# Patient Record
Sex: FEMALE | Race: WHITE | Employment: OTHER | URBAN - METROPOLITAN AREA
[De-identification: names, ages, dates, MRNs, and addresses within clinical notes are randomized per-mention and may not be internally consistent; named-entity substitution may affect disease eponyms.]

---

## 2017-01-02 PROBLEM — M81.0 OSTEOPOROSIS: Status: ACTIVE | Noted: 2017-01-02

## 2017-01-02 PROBLEM — J44.1 COPD EXACERBATION (HCC): Status: ACTIVE | Noted: 2017-01-02

## 2017-01-03 ENCOUNTER — CARE COORDINATION (OUTPATIENT)
Dept: CARE COORDINATION | Age: 77
End: 2017-01-03

## 2017-01-11 ENCOUNTER — OFFICE VISIT (OUTPATIENT)
Dept: PRIMARY CARE CLINIC | Age: 77
End: 2017-01-11
Payer: MEDICARE

## 2017-01-11 ENCOUNTER — HOSPITAL ENCOUNTER (OUTPATIENT)
Dept: OTHER | Age: 77
Discharge: HOME | End: 2017-01-11
Attending: PEDIATRICS | Admitting: PEDIATRICS

## 2017-01-11 VITALS
RESPIRATION RATE: 20 BRPM | SYSTOLIC BLOOD PRESSURE: 128 MMHG | HEIGHT: 62 IN | BODY MASS INDEX: 23.37 KG/M2 | WEIGHT: 127 LBS | DIASTOLIC BLOOD PRESSURE: 74 MMHG | HEART RATE: 97 BPM | OXYGEN SATURATION: 90 %

## 2017-01-11 DIAGNOSIS — M85.80 OSTEOPENIA: Chronic | ICD-10-CM

## 2017-01-11 DIAGNOSIS — S22.070A CLOSED WEDGE COMPRESSION FRACTURE OF TENTH THORACIC VERTEBRA, INITIAL ENCOUNTER: ICD-10-CM

## 2017-01-11 DIAGNOSIS — S22.070A CLOSED WEDGE COMPRESSION FRACTURE OF NINTH THORACIC VERTEBRA, INITIAL ENCOUNTER: ICD-10-CM

## 2017-01-11 DIAGNOSIS — M54.6 CHRONIC BILATERAL THORACIC BACK PAIN: ICD-10-CM

## 2017-01-11 DIAGNOSIS — E87.6 HYPOKALEMIA: Primary | ICD-10-CM

## 2017-01-11 DIAGNOSIS — G89.29 CHRONIC BILATERAL THORACIC BACK PAIN: ICD-10-CM

## 2017-01-11 DIAGNOSIS — D64.9 ANEMIA, UNSPECIFIED TYPE: ICD-10-CM

## 2017-01-11 PROCEDURE — 99495 TRANSJ CARE MGMT MOD F2F 14D: CPT | Performed by: PEDIATRICS

## 2017-01-11 ASSESSMENT — ENCOUNTER SYMPTOMS
WHEEZING: 0
ABDOMINAL PAIN: 0
COUGH: 0
SORE THROAT: 0
EYE DISCHARGE: 0
BACK PAIN: 1
SHORTNESS OF BREATH: 0
NAUSEA: 0
VOMITING: 0
SINUS PRESSURE: 0

## 2017-01-12 LAB
ANION GAP SERPL CALCULATED.3IONS-SCNC: 13 MMOL/L (ref 3–16)
BUN BLDV-MCNC: 8 MG/DL (ref 6–20)
CALCIUM SERPL-MCNC: 9.4 MG/DL (ref 8.5–10.5)
CHLORIDE BLD-SCNC: 108 MMOL/L (ref 98–107)
CO2: 26 MMOL/L (ref 20–30)
CREAT SERPL-MCNC: 0.7 MG/DL (ref 0.4–1.2)
GFR AFRICAN AMERICAN: >59
GFR NON-AFRICAN AMERICAN: >60
GLUCOSE BLD-MCNC: 107 MG/DL (ref 74–106)
POTASSIUM SERPL-SCNC: 3.7 MMOL/L (ref 3.4–5.1)
SODIUM BLD-SCNC: 147 MMOL/L (ref 136–145)

## 2017-01-18 ENCOUNTER — TELEPHONE (OUTPATIENT)
Dept: PRIMARY CARE CLINIC | Age: 77
End: 2017-01-18

## 2017-01-18 RX ORDER — DOXEPIN HYDROCHLORIDE 50 MG/1
CAPSULE ORAL
Qty: 30 CAPSULE | Refills: 5 | Status: SHIPPED | OUTPATIENT
Start: 2017-01-18 | End: 2017-08-17 | Stop reason: SDUPTHER

## 2017-01-20 ENCOUNTER — HOSPITAL ENCOUNTER (OUTPATIENT)
Dept: OTHER | Age: 77
Discharge: HOME | End: 2017-01-20
Attending: PEDIATRICS | Admitting: PEDIATRICS

## 2017-01-20 LAB
ANION GAP SERPL CALCULATED.3IONS-SCNC: 12 MMOL/L (ref 3–16)
BUN BLDV-MCNC: 16 MG/DL (ref 6–20)
CALCIUM SERPL-MCNC: 9.6 MG/DL (ref 8.5–10.5)
CHLORIDE BLD-SCNC: 107 MMOL/L (ref 98–107)
CO2: 25 MMOL/L (ref 20–30)
CREAT SERPL-MCNC: 0.8 MG/DL (ref 0.4–1.2)
GFR AFRICAN AMERICAN: >59
GFR NON-AFRICAN AMERICAN: >60
GLUCOSE BLD-MCNC: 83 MG/DL (ref 74–106)
HCT VFR BLD CALC: 35.8 % (ref 37–47)
HEMOGLOBIN: 10.7 G/DL (ref 11.5–16.5)
MCH RBC QN AUTO: 29.6 PG (ref 27–32)
MCHC RBC AUTO-ENTMCNC: 29.9 G/DL (ref 31–35)
MCV RBC AUTO: 99.2 FL (ref 80–100)
PDW BLD-RTO: 14.2 % (ref 11–16)
PLATELET # BLD: 294 K/UL (ref 150–400)
PMV BLD AUTO: 10.4 FL (ref 6–10)
POTASSIUM SERPL-SCNC: 4.2 MMOL/L (ref 3.4–5.1)
RBC # BLD: 3.61 M/UL (ref 3.8–5.8)
SODIUM BLD-SCNC: 144 MMOL/L (ref 136–145)
WBC # BLD: 6.5 K/UL (ref 4–11)

## 2017-02-03 ENCOUNTER — OFFICE VISIT (OUTPATIENT)
Dept: PRIMARY CARE CLINIC | Age: 77
End: 2017-02-03
Payer: MEDICARE

## 2017-02-03 VITALS
DIASTOLIC BLOOD PRESSURE: 70 MMHG | SYSTOLIC BLOOD PRESSURE: 120 MMHG | HEART RATE: 89 BPM | WEIGHT: 121 LBS | OXYGEN SATURATION: 96 % | BODY MASS INDEX: 22.13 KG/M2

## 2017-02-03 DIAGNOSIS — M06.9 RHEUMATOID ARTHRITIS, INVOLVING UNSPECIFIED SITE, UNSPECIFIED RHEUMATOID FACTOR PRESENCE: ICD-10-CM

## 2017-02-03 DIAGNOSIS — F09 COGNITIVE DYSFUNCTION: ICD-10-CM

## 2017-02-03 DIAGNOSIS — F41.9 ANXIETY: Primary | ICD-10-CM

## 2017-02-03 DIAGNOSIS — I10 ESSENTIAL HYPERTENSION: ICD-10-CM

## 2017-02-03 DIAGNOSIS — G47.00 INSOMNIA, UNSPECIFIED TYPE: ICD-10-CM

## 2017-02-03 DIAGNOSIS — J44.9 CHRONIC OBSTRUCTIVE PULMONARY DISEASE, UNSPECIFIED COPD TYPE (HCC): ICD-10-CM

## 2017-02-03 PROCEDURE — G8399 PT W/DXA RESULTS DOCUMENT: HCPCS | Performed by: NURSE PRACTITIONER

## 2017-02-03 PROCEDURE — 1123F ACP DISCUSS/DSCN MKR DOCD: CPT | Performed by: NURSE PRACTITIONER

## 2017-02-03 PROCEDURE — 1090F PRES/ABSN URINE INCON ASSESS: CPT | Performed by: NURSE PRACTITIONER

## 2017-02-03 PROCEDURE — G8419 CALC BMI OUT NRM PARAM NOF/U: HCPCS | Performed by: NURSE PRACTITIONER

## 2017-02-03 PROCEDURE — G8427 DOCREV CUR MEDS BY ELIG CLIN: HCPCS | Performed by: NURSE PRACTITIONER

## 2017-02-03 PROCEDURE — G8484 FLU IMMUNIZE NO ADMIN: HCPCS | Performed by: NURSE PRACTITIONER

## 2017-02-03 PROCEDURE — 3017F COLORECTAL CA SCREEN DOC REV: CPT | Performed by: NURSE PRACTITIONER

## 2017-02-03 PROCEDURE — 4040F PNEUMOC VAC/ADMIN/RCVD: CPT | Performed by: NURSE PRACTITIONER

## 2017-02-03 PROCEDURE — 1036F TOBACCO NON-USER: CPT | Performed by: NURSE PRACTITIONER

## 2017-02-03 PROCEDURE — 3023F SPIROM DOC REV: CPT | Performed by: NURSE PRACTITIONER

## 2017-02-03 PROCEDURE — G8926 SPIRO NO PERF OR DOC: HCPCS | Performed by: NURSE PRACTITIONER

## 2017-02-03 PROCEDURE — 99213 OFFICE O/P EST LOW 20 MIN: CPT | Performed by: NURSE PRACTITIONER

## 2017-02-03 ASSESSMENT — ENCOUNTER SYMPTOMS
RESPIRATORY NEGATIVE: 1
GASTROINTESTINAL NEGATIVE: 1

## 2017-02-07 RX ORDER — PANTOPRAZOLE SODIUM 40 MG/1
40 TABLET, DELAYED RELEASE ORAL DAILY
Qty: 30 TABLET | Refills: 1 | OUTPATIENT
Start: 2017-02-07

## 2017-02-07 RX ORDER — PANTOPRAZOLE SODIUM 40 MG/1
TABLET, DELAYED RELEASE ORAL
Qty: 30 TABLET | Refills: 1 | Status: SHIPPED | OUTPATIENT
Start: 2017-02-07 | End: 2017-04-11 | Stop reason: SDUPTHER

## 2017-02-15 ENCOUNTER — TELEPHONE (OUTPATIENT)
Dept: PRIMARY CARE CLINIC | Age: 77
End: 2017-02-15

## 2017-02-26 ASSESSMENT — ENCOUNTER SYMPTOMS
NAUSEA: 0
SORE THROAT: 0
COUGH: 0
ABDOMINAL PAIN: 0
SHORTNESS OF BREATH: 0
EYE PAIN: 0
VOMITING: 0

## 2017-02-27 RX ORDER — TRIAMTERENE AND HYDROCHLOROTHIAZIDE 37.5; 25 MG/1; MG/1
1 TABLET ORAL DAILY
Qty: 30 TABLET | Refills: 5 | Status: SHIPPED | OUTPATIENT
Start: 2017-02-27 | End: 2017-08-03 | Stop reason: ALTCHOICE

## 2017-02-27 RX ORDER — PREDNISONE 1 MG/1
3 TABLET ORAL DAILY
Qty: 90 TABLET | Refills: 5 | Status: SHIPPED | OUTPATIENT
Start: 2017-02-27 | End: 2017-08-03 | Stop reason: SDUPTHER

## 2017-03-30 RX ORDER — RANITIDINE 300 MG/1
TABLET ORAL
Qty: 60 TABLET | Refills: 5 | Status: SHIPPED | OUTPATIENT
Start: 2017-03-30 | End: 2017-11-17 | Stop reason: SDUPTHER

## 2017-04-11 ENCOUNTER — TELEPHONE (OUTPATIENT)
Dept: PRIMARY CARE CLINIC | Age: 77
End: 2017-04-11

## 2017-05-03 ENCOUNTER — OFFICE VISIT (OUTPATIENT)
Dept: PRIMARY CARE CLINIC | Age: 77
End: 2017-05-03
Payer: MEDICARE

## 2017-05-03 VITALS
HEART RATE: 91 BPM | WEIGHT: 121.8 LBS | DIASTOLIC BLOOD PRESSURE: 70 MMHG | SYSTOLIC BLOOD PRESSURE: 120 MMHG | OXYGEN SATURATION: 93 % | BODY MASS INDEX: 22.28 KG/M2

## 2017-05-03 DIAGNOSIS — F09 COGNITIVE DYSFUNCTION: ICD-10-CM

## 2017-05-03 DIAGNOSIS — F41.9 ANXIETY: Primary | ICD-10-CM

## 2017-05-03 DIAGNOSIS — I10 ESSENTIAL HYPERTENSION: ICD-10-CM

## 2017-05-03 DIAGNOSIS — J44.9 CHRONIC OBSTRUCTIVE PULMONARY DISEASE, UNSPECIFIED COPD TYPE (HCC): ICD-10-CM

## 2017-05-03 DIAGNOSIS — M06.9 RHEUMATOID ARTHRITIS, INVOLVING UNSPECIFIED SITE, UNSPECIFIED RHEUMATOID FACTOR PRESENCE: ICD-10-CM

## 2017-05-03 DIAGNOSIS — G47.00 INSOMNIA, UNSPECIFIED TYPE: ICD-10-CM

## 2017-05-03 PROCEDURE — G8399 PT W/DXA RESULTS DOCUMENT: HCPCS | Performed by: NURSE PRACTITIONER

## 2017-05-03 PROCEDURE — 4040F PNEUMOC VAC/ADMIN/RCVD: CPT | Performed by: NURSE PRACTITIONER

## 2017-05-03 PROCEDURE — G8419 CALC BMI OUT NRM PARAM NOF/U: HCPCS | Performed by: NURSE PRACTITIONER

## 2017-05-03 PROCEDURE — 1090F PRES/ABSN URINE INCON ASSESS: CPT | Performed by: NURSE PRACTITIONER

## 2017-05-03 PROCEDURE — G8926 SPIRO NO PERF OR DOC: HCPCS | Performed by: NURSE PRACTITIONER

## 2017-05-03 PROCEDURE — G8427 DOCREV CUR MEDS BY ELIG CLIN: HCPCS | Performed by: NURSE PRACTITIONER

## 2017-05-03 PROCEDURE — 1036F TOBACCO NON-USER: CPT | Performed by: NURSE PRACTITIONER

## 2017-05-03 PROCEDURE — 3023F SPIROM DOC REV: CPT | Performed by: NURSE PRACTITIONER

## 2017-05-03 PROCEDURE — 99213 OFFICE O/P EST LOW 20 MIN: CPT | Performed by: NURSE PRACTITIONER

## 2017-05-03 PROCEDURE — 3017F COLORECTAL CA SCREEN DOC REV: CPT | Performed by: NURSE PRACTITIONER

## 2017-05-03 PROCEDURE — 1123F ACP DISCUSS/DSCN MKR DOCD: CPT | Performed by: NURSE PRACTITIONER

## 2017-05-03 RX ORDER — CLONAZEPAM 0.5 MG/1
0.5 TABLET ORAL NIGHTLY PRN
Qty: 30 TABLET | Refills: 0 | Status: SHIPPED | OUTPATIENT
Start: 2017-05-03 | End: 2017-06-14 | Stop reason: SDUPTHER

## 2017-05-03 ASSESSMENT — PATIENT HEALTH QUESTIONNAIRE - PHQ9
1. LITTLE INTEREST OR PLEASURE IN DOING THINGS: 1
2. FEELING DOWN, DEPRESSED OR HOPELESS: 1
SUM OF ALL RESPONSES TO PHQ QUESTIONS 1-9: 2
SUM OF ALL RESPONSES TO PHQ9 QUESTIONS 1 & 2: 2

## 2017-05-03 ASSESSMENT — ENCOUNTER SYMPTOMS
SHORTNESS OF BREATH: 1
GASTROINTESTINAL NEGATIVE: 1

## 2017-05-08 ASSESSMENT — ENCOUNTER SYMPTOMS
VOMITING: 0
SORE THROAT: 0
NAUSEA: 0
ABDOMINAL PAIN: 0
EYE PAIN: 0
COUGH: 0

## 2017-05-16 RX ORDER — ATORVASTATIN CALCIUM 80 MG/1
TABLET, FILM COATED ORAL
Qty: 30 TABLET | Refills: 5 | Status: SHIPPED | OUTPATIENT
Start: 2017-05-16 | End: 2017-08-03 | Stop reason: SDUPTHER

## 2017-06-14 RX ORDER — ISOSORBIDE MONONITRATE 30 MG/1
TABLET, EXTENDED RELEASE ORAL
Qty: 30 TABLET | Refills: 5 | Status: SHIPPED | OUTPATIENT
Start: 2017-06-14 | End: 2017-12-27 | Stop reason: SDUPTHER

## 2017-06-14 RX ORDER — CLONAZEPAM 0.5 MG/1
TABLET ORAL
Qty: 30 TABLET | Refills: 0 | Status: SHIPPED | OUTPATIENT
Start: 2017-06-14 | End: 2017-07-13 | Stop reason: SDUPTHER

## 2017-06-14 RX ORDER — UMECLIDINIUM BROMIDE AND VILANTEROL TRIFENATATE 62.5; 25 UG/1; UG/1
POWDER RESPIRATORY (INHALATION)
Qty: 1 EACH | Refills: 4 | Status: SHIPPED | OUTPATIENT
Start: 2017-06-14 | End: 2018-01-08 | Stop reason: SDUPTHER

## 2017-06-29 ENCOUNTER — HOSPITAL ENCOUNTER (OUTPATIENT)
Dept: MRI IMAGING | Age: 77
Discharge: HOME | End: 2017-06-29

## 2017-06-29 DIAGNOSIS — M54.6 PAIN IN THORACIC SPINE: ICD-10-CM

## 2017-07-01 ENCOUNTER — TELEPHONE (OUTPATIENT)
Dept: PRIMARY CARE CLINIC | Age: 77
End: 2017-07-01

## 2017-07-13 RX ORDER — CLONAZEPAM 0.5 MG/1
TABLET ORAL
Qty: 30 TABLET | Refills: 0 | Status: SHIPPED | OUTPATIENT
Start: 2017-07-13 | End: 2017-08-03 | Stop reason: SDUPTHER

## 2017-08-03 ENCOUNTER — OFFICE VISIT (OUTPATIENT)
Dept: PRIMARY CARE CLINIC | Age: 77
End: 2017-08-03
Payer: MEDICARE

## 2017-08-03 ENCOUNTER — HOSPITAL ENCOUNTER (OUTPATIENT)
Dept: OTHER | Age: 77
Discharge: HOME | End: 2017-08-03
Attending: NURSE PRACTITIONER | Admitting: NURSE PRACTITIONER

## 2017-08-03 VITALS
DIASTOLIC BLOOD PRESSURE: 68 MMHG | OXYGEN SATURATION: 98 % | RESPIRATION RATE: 20 BRPM | HEART RATE: 84 BPM | SYSTOLIC BLOOD PRESSURE: 136 MMHG | WEIGHT: 124.6 LBS | TEMPERATURE: 98.6 F | BODY MASS INDEX: 22.79 KG/M2

## 2017-08-03 DIAGNOSIS — E78.5 HYPERLIPIDEMIA, UNSPECIFIED HYPERLIPIDEMIA TYPE: Primary | ICD-10-CM

## 2017-08-03 DIAGNOSIS — M06.9 RHEUMATOID ARTHRITIS, INVOLVING UNSPECIFIED SITE, UNSPECIFIED RHEUMATOID FACTOR PRESENCE: ICD-10-CM

## 2017-08-03 DIAGNOSIS — F41.9 ANXIETY: ICD-10-CM

## 2017-08-03 DIAGNOSIS — S22.000A THORACIC COMPRESSION FRACTURE, CLOSED, INITIAL ENCOUNTER (HCC): ICD-10-CM

## 2017-08-03 DIAGNOSIS — J44.9 CHRONIC OBSTRUCTIVE PULMONARY DISEASE, UNSPECIFIED COPD TYPE (HCC): ICD-10-CM

## 2017-08-03 PROCEDURE — G8428 CUR MEDS NOT DOCUMENT: HCPCS | Performed by: NURSE PRACTITIONER

## 2017-08-03 PROCEDURE — 3017F COLORECTAL CA SCREEN DOC REV: CPT | Performed by: NURSE PRACTITIONER

## 2017-08-03 PROCEDURE — 1123F ACP DISCUSS/DSCN MKR DOCD: CPT | Performed by: NURSE PRACTITIONER

## 2017-08-03 PROCEDURE — G8420 CALC BMI NORM PARAMETERS: HCPCS | Performed by: NURSE PRACTITIONER

## 2017-08-03 PROCEDURE — 1036F TOBACCO NON-USER: CPT | Performed by: NURSE PRACTITIONER

## 2017-08-03 PROCEDURE — 1090F PRES/ABSN URINE INCON ASSESS: CPT | Performed by: NURSE PRACTITIONER

## 2017-08-03 PROCEDURE — 4040F PNEUMOC VAC/ADMIN/RCVD: CPT | Performed by: NURSE PRACTITIONER

## 2017-08-03 PROCEDURE — G8399 PT W/DXA RESULTS DOCUMENT: HCPCS | Performed by: NURSE PRACTITIONER

## 2017-08-03 PROCEDURE — 3023F SPIROM DOC REV: CPT | Performed by: NURSE PRACTITIONER

## 2017-08-03 PROCEDURE — 99213 OFFICE O/P EST LOW 20 MIN: CPT | Performed by: NURSE PRACTITIONER

## 2017-08-03 PROCEDURE — G8926 SPIRO NO PERF OR DOC: HCPCS | Performed by: NURSE PRACTITIONER

## 2017-08-03 RX ORDER — TRAMADOL HYDROCHLORIDE 50 MG/1
50 TABLET ORAL EVERY 6 HOURS PRN
Qty: 30 TABLET | Refills: 2 | Status: SHIPPED | OUTPATIENT
Start: 2017-08-03 | End: 2017-11-03 | Stop reason: SDUPTHER

## 2017-08-03 RX ORDER — CLONAZEPAM 0.5 MG/1
TABLET ORAL
Qty: 30 TABLET | Refills: 0 | Status: SHIPPED | OUTPATIENT
Start: 2017-08-03 | End: 2017-09-12 | Stop reason: SDUPTHER

## 2017-08-03 RX ORDER — ATORVASTATIN CALCIUM 80 MG/1
TABLET, FILM COATED ORAL
Qty: 30 TABLET | Refills: 5 | Status: SHIPPED | OUTPATIENT
Start: 2017-08-03 | End: 2017-11-03 | Stop reason: SDUPTHER

## 2017-08-03 RX ORDER — PREDNISONE 1 MG/1
3 TABLET ORAL DAILY
Qty: 90 TABLET | Refills: 5 | Status: SHIPPED | OUTPATIENT
Start: 2017-08-03 | End: 2018-03-13 | Stop reason: SDUPTHER

## 2017-08-03 ASSESSMENT — ENCOUNTER SYMPTOMS
EYES NEGATIVE: 1
ALLERGIC/IMMUNOLOGIC NEGATIVE: 1
GASTROINTESTINAL NEGATIVE: 1
SHORTNESS OF BREATH: 1
BACK PAIN: 1

## 2017-08-04 DIAGNOSIS — E78.5 HYPERLIPIDEMIA, UNSPECIFIED HYPERLIPIDEMIA TYPE: ICD-10-CM

## 2017-08-04 DIAGNOSIS — M06.9 RHEUMATOID ARTHRITIS, INVOLVING UNSPECIFIED SITE, UNSPECIFIED RHEUMATOID FACTOR PRESENCE: ICD-10-CM

## 2017-08-04 LAB
A/G RATIO: 1.3 (ref 0.8–2)
ALBUMIN SERPL-MCNC: 3.8 G/DL (ref 3.4–4.8)
ALP BLD-CCNC: 62 U/L (ref 25–100)
ALT SERPL-CCNC: 12 U/L (ref 4–36)
ANION GAP SERPL CALCULATED.3IONS-SCNC: 14 MMOL/L (ref 3–16)
AST SERPL-CCNC: 15 U/L (ref 8–33)
BASOPHILS ABSOLUTE: 0 K/UL (ref 0–0.1)
BASOPHILS RELATIVE PERCENT: 0.2 %
BILIRUB SERPL-MCNC: <0.2 MG/DL (ref 0.3–1.2)
BUN BLDV-MCNC: 27 MG/DL (ref 6–20)
CALCIUM SERPL-MCNC: 9.5 MG/DL (ref 8.5–10.5)
CHLORIDE BLD-SCNC: 102 MMOL/L (ref 98–107)
CHOLESTEROL, TOTAL: 139 MG/DL (ref 0–200)
CO2: 25 MMOL/L (ref 20–30)
CREAT SERPL-MCNC: 1.1 MG/DL (ref 0.4–1.2)
EOSINOPHILS ABSOLUTE: 0.1 K/UL (ref 0–0.4)
EOSINOPHILS RELATIVE PERCENT: 1.1 %
GFR AFRICAN AMERICAN: 58
GFR NON-AFRICAN AMERICAN: 48
GLOBULIN: 3 G/DL
GLUCOSE BLD-MCNC: 105 MG/DL (ref 74–106)
HCT VFR BLD CALC: 34.7 % (ref 37–47)
HDLC SERPL-MCNC: 61 MG/DL (ref 40–60)
HEMOGLOBIN: 10.6 G/DL (ref 11.5–16.5)
LDL CHOLESTEROL CALCULATED: 54 MG/DL
LYMPHOCYTES ABSOLUTE: 0.8 K/UL (ref 1.5–4)
LYMPHOCYTES RELATIVE PERCENT: 10.3 % (ref 20–40)
MCH RBC QN AUTO: 29.4 PG (ref 27–32)
MCHC RBC AUTO-ENTMCNC: 30.5 G/DL (ref 31–35)
MCV RBC AUTO: 96.4 FL (ref 80–100)
MONOCYTES ABSOLUTE: 0.5 K/UL (ref 0.2–0.8)
MONOCYTES RELATIVE PERCENT: 6.5 % (ref 3–10)
NEUTROPHILS ABSOLUTE: 6.6 K/UL (ref 2–7.5)
NEUTROPHILS RELATIVE PERCENT: 81.9 %
PDW BLD-RTO: 13.8 % (ref 11–16)
PLATELET # BLD: 332 K/UL (ref 150–400)
PMV BLD AUTO: 9.8 FL (ref 6–10)
POTASSIUM SERPL-SCNC: 4.4 MMOL/L (ref 3.4–5.1)
RBC # BLD: 3.6 M/UL (ref 3.8–5.8)
SODIUM BLD-SCNC: 141 MMOL/L (ref 136–145)
TOTAL PROTEIN: 6.8 G/DL (ref 6.4–8.3)
TRIGL SERPL-MCNC: 120 MG/DL (ref 0–249)
VLDLC SERPL CALC-MCNC: 24 MG/DL
WBC # BLD: 8.1 K/UL (ref 4–11)

## 2017-08-07 ENCOUNTER — TELEPHONE (OUTPATIENT)
Dept: PRIMARY CARE CLINIC | Age: 77
End: 2017-08-07

## 2017-08-14 ASSESSMENT — ENCOUNTER SYMPTOMS
COUGH: 0
ABDOMINAL PAIN: 0
NAUSEA: 0
VOMITING: 0
EYE PAIN: 0
SORE THROAT: 0

## 2017-08-17 ENCOUNTER — HOSPITAL ENCOUNTER (OUTPATIENT)
Dept: PHYSICAL THERAPY | Age: 77
Discharge: HOME | End: 2017-08-31
Attending: ORTHOPAEDIC SURGERY | Admitting: ORTHOPAEDIC SURGERY

## 2017-08-17 ASSESSMENT — PAIN DESCRIPTION - DESCRIPTORS: DESCRIPTORS: ACHING;DULL;DISCOMFORT

## 2017-08-17 ASSESSMENT — PAIN DESCRIPTION - PROGRESSION: CLINICAL_PROGRESSION: NOT CHANGED

## 2017-08-17 ASSESSMENT — PAIN DESCRIPTION - LOCATION: LOCATION: BACK

## 2017-08-17 ASSESSMENT — PAIN DESCRIPTION - PAIN TYPE: TYPE: CHRONIC PAIN

## 2017-08-17 ASSESSMENT — PAIN DESCRIPTION - FREQUENCY: FREQUENCY: CONTINUOUS

## 2017-08-17 ASSESSMENT — PAIN DESCRIPTION - ORIENTATION: ORIENTATION: MID

## 2017-08-18 RX ORDER — DOXEPIN HYDROCHLORIDE 50 MG/1
CAPSULE ORAL
Qty: 30 CAPSULE | Refills: 5 | Status: SHIPPED | OUTPATIENT
Start: 2017-08-18 | End: 2018-03-05 | Stop reason: SDUPTHER

## 2017-08-22 ENCOUNTER — HOSPITAL ENCOUNTER (OUTPATIENT)
Dept: PHYSICAL THERAPY | Age: 77
Discharge: HOME | End: 2017-08-22

## 2017-08-24 ENCOUNTER — HOSPITAL ENCOUNTER (OUTPATIENT)
Dept: PHYSICAL THERAPY | Age: 77
Discharge: HOME | End: 2017-08-24

## 2017-08-29 ENCOUNTER — HOSPITAL ENCOUNTER (OUTPATIENT)
Dept: PHYSICAL THERAPY | Age: 77
Discharge: HOME | End: 2017-08-29

## 2017-08-31 ENCOUNTER — HOSPITAL ENCOUNTER (OUTPATIENT)
Dept: PHYSICAL THERAPY | Age: 77
Discharge: HOME | End: 2017-08-31

## 2017-09-07 ENCOUNTER — HOSPITAL ENCOUNTER (OUTPATIENT)
Dept: PHYSICAL THERAPY | Age: 77
Discharge: HOME | End: 2017-09-07

## 2017-09-12 ENCOUNTER — HOSPITAL ENCOUNTER (OUTPATIENT)
Dept: PHYSICAL THERAPY | Age: 77
Discharge: HOME | End: 2017-09-12

## 2017-09-13 RX ORDER — TRIAMTERENE AND HYDROCHLOROTHIAZIDE 37.5; 25 MG/1; MG/1
TABLET ORAL
Qty: 30 TABLET | Refills: 5 | Status: SHIPPED | OUTPATIENT
Start: 2017-09-13 | End: 2018-04-26 | Stop reason: SDUPTHER

## 2017-09-13 RX ORDER — CETIRIZINE HYDROCHLORIDE 10 MG/1
TABLET ORAL
Qty: 30 TABLET | Refills: 5 | Status: SHIPPED | OUTPATIENT
Start: 2017-09-13 | End: 2018-04-26 | Stop reason: SDUPTHER

## 2017-09-13 RX ORDER — CLONAZEPAM 0.5 MG/1
TABLET ORAL
Qty: 30 TABLET | Refills: 0 | Status: SHIPPED | OUTPATIENT
Start: 2017-09-13 | End: 2017-10-13 | Stop reason: SDUPTHER

## 2017-09-14 ENCOUNTER — HOSPITAL ENCOUNTER (OUTPATIENT)
Dept: PHYSICAL THERAPY | Age: 77
Discharge: HOME | End: 2017-09-14

## 2017-09-19 ENCOUNTER — HOSPITAL ENCOUNTER (OUTPATIENT)
Dept: PHYSICAL THERAPY | Age: 77
Discharge: HOME | End: 2017-09-19

## 2017-09-21 ENCOUNTER — HOSPITAL ENCOUNTER (OUTPATIENT)
Dept: PHYSICAL THERAPY | Age: 77
Discharge: HOME | End: 2017-09-21

## 2017-09-28 ENCOUNTER — HOSPITAL ENCOUNTER (OUTPATIENT)
Dept: PHYSICAL THERAPY | Age: 77
Discharge: HOME | End: 2017-09-28

## 2017-09-28 NOTE — PROGRESS NOTES
Physical Therapy Daily Treatment Note   Date:  2017    TIme In:   1530                 Time Out:     1630           Patient Name:  Grey Escamilla    :  1940  MRN: [id number]    Restrictions/Precautions:    Pertinent Medical History:  Medical/Treatment Diagnosis Information:  ·   thoracic back pain; strain/sprain; s/p compression fracture; difficulty walking     Insurance/Certification information:    Medicare  Physician Information:    Karen Ventura PA-C  Plan of care signed (Y/N):    Visit# / total visits:     11    G-Code (if applicable):      Date / Visit # G-Code Applied:         Progress Note: [x]  Yes  []  No  Next due by: Visit #10      Pain level:   0/10 back    Subjective:  Pt reports her back is not hurting today. Objective:  Observation:   Test measurements: Back Index: 62% , TU\". Palpation:    Exercises:  Exercise Resistance/Repetitions Other comments   Nustep L4 - 5' 28   Standing marching w/support 1' x 2 28   Standing heel-toe raises 20 x 2 28        Pulleys 3' 28   scap squeezes 20x 28   Mid rows with T-band Red: 2 x15 28                              Other Therapeutic Activities:     Manual Treatments:      Modalities:  IFC with MH to T-spine x  15'       Timed Code Treatment Minutes:  45      Total Treatment Minutes:  60    Treatment/Activity Tolerance:  [x] Patient tolerated treatment well [] Patient limited by fatigue  [] Patient limited by pain  [] Patient limited by other medical complications  [x] Other:  Pt completed tx without c/o pain. Pain after treatment:     0/10    Prognosis: [x] Good [] Fair  [] Poor    Goals  Short term goals  Time Frame for Short term goals: 2-3 weeks  Short term goal 1: Independent with HEP. - met  Short term goal 2: Report a 25% decrease in back pain. - met  Long term goals  Time Frame for Long term goals : 6 weeks  Long term goal 1: Achieve back pain at or less than 1/10 > 75% of time with ADL's and I-ADL's.   Long term goal 2:

## 2017-10-01 ENCOUNTER — HOSPITAL ENCOUNTER (OUTPATIENT)
Dept: OTHER | Age: 77
Discharge: HOME | End: 2017-10-31
Attending: ORTHOPAEDIC SURGERY | Admitting: ORTHOPAEDIC SURGERY

## 2017-10-05 ENCOUNTER — HOSPITAL ENCOUNTER (OUTPATIENT)
Dept: PHYSICAL THERAPY | Age: 77
Discharge: HOME | End: 2017-10-05

## 2017-10-05 NOTE — PROGRESS NOTES
weeks  Long term goal 1: Achieve back pain at or less than 1/10 > 75% of time with ADL's and I-ADL's. Long term goal 2: Achieve a TUG time of 14 seconds. - met  Long term goal 3: Report a 50% decrease in fear of falling. Long term goal 4: Achieve a Back Index score of 30 or less. Patient Goals   Patient goals : alleviate back pain, reduce fall risk    Showing gradual, steady progress with functional improvement; pain levels are not changing as much as desired.     Patient Requires Follow-up: [x] Yes  [] No    Plan:   [x] Continue per plan of care [] Alter current plan (see comments)  [] Plan of care initiated [] Hold pending MD visit [] Discharge    Plan for Next Session:        Electronically signed by:   Electronically signed by Edwardo Rod PTA on 10/5/2017 at 3:29 PM

## 2017-10-09 RX ORDER — IPRATROPIUM BROMIDE AND ALBUTEROL SULFATE 2.5; .5 MG/3ML; MG/3ML
SOLUTION RESPIRATORY (INHALATION)
Qty: 360 ML | Refills: 5 | Status: SHIPPED | OUTPATIENT
Start: 2017-10-09 | End: 2018-04-26 | Stop reason: SDUPTHER

## 2017-10-13 RX ORDER — CLONAZEPAM 0.5 MG/1
TABLET ORAL
Qty: 30 TABLET | Refills: 0 | Status: SHIPPED | OUTPATIENT
Start: 2017-10-13 | End: 2017-11-03 | Stop reason: SDUPTHER

## 2017-11-01 ENCOUNTER — HOSPITAL ENCOUNTER (OUTPATIENT)
Dept: OTHER | Age: 77
Discharge: HOME | End: 2017-11-30
Attending: ORTHOPAEDIC SURGERY | Admitting: ORTHOPAEDIC SURGERY

## 2017-11-03 ENCOUNTER — OFFICE VISIT (OUTPATIENT)
Dept: PRIMARY CARE CLINIC | Age: 77
End: 2017-11-03
Payer: MEDICARE

## 2017-11-03 VITALS
SYSTOLIC BLOOD PRESSURE: 118 MMHG | OXYGEN SATURATION: 93 % | HEART RATE: 89 BPM | BODY MASS INDEX: 22.68 KG/M2 | DIASTOLIC BLOOD PRESSURE: 60 MMHG | WEIGHT: 124 LBS

## 2017-11-03 DIAGNOSIS — F41.9 ANXIETY: ICD-10-CM

## 2017-11-03 DIAGNOSIS — K21.9 GASTROESOPHAGEAL REFLUX DISEASE WITHOUT ESOPHAGITIS: ICD-10-CM

## 2017-11-03 DIAGNOSIS — M06.9 RHEUMATOID ARTHRITIS, INVOLVING UNSPECIFIED SITE, UNSPECIFIED RHEUMATOID FACTOR PRESENCE: Primary | ICD-10-CM

## 2017-11-03 DIAGNOSIS — D64.9 ANEMIA, UNSPECIFIED TYPE: ICD-10-CM

## 2017-11-03 DIAGNOSIS — E78.5 HYPERLIPIDEMIA, UNSPECIFIED HYPERLIPIDEMIA TYPE: ICD-10-CM

## 2017-11-03 PROCEDURE — 1090F PRES/ABSN URINE INCON ASSESS: CPT | Performed by: NURSE PRACTITIONER

## 2017-11-03 PROCEDURE — G8427 DOCREV CUR MEDS BY ELIG CLIN: HCPCS | Performed by: NURSE PRACTITIONER

## 2017-11-03 PROCEDURE — 90688 IIV4 VACCINE SPLT 0.5 ML IM: CPT | Performed by: NURSE PRACTITIONER

## 2017-11-03 PROCEDURE — 4040F PNEUMOC VAC/ADMIN/RCVD: CPT | Performed by: NURSE PRACTITIONER

## 2017-11-03 PROCEDURE — 1036F TOBACCO NON-USER: CPT | Performed by: NURSE PRACTITIONER

## 2017-11-03 PROCEDURE — G8420 CALC BMI NORM PARAMETERS: HCPCS | Performed by: NURSE PRACTITIONER

## 2017-11-03 PROCEDURE — G0008 ADMIN INFLUENZA VIRUS VAC: HCPCS | Performed by: NURSE PRACTITIONER

## 2017-11-03 PROCEDURE — G8399 PT W/DXA RESULTS DOCUMENT: HCPCS | Performed by: NURSE PRACTITIONER

## 2017-11-03 PROCEDURE — 99213 OFFICE O/P EST LOW 20 MIN: CPT | Performed by: NURSE PRACTITIONER

## 2017-11-03 PROCEDURE — G8484 FLU IMMUNIZE NO ADMIN: HCPCS | Performed by: NURSE PRACTITIONER

## 2017-11-03 PROCEDURE — 1123F ACP DISCUSS/DSCN MKR DOCD: CPT | Performed by: NURSE PRACTITIONER

## 2017-11-03 PROCEDURE — 3017F COLORECTAL CA SCREEN DOC REV: CPT | Performed by: NURSE PRACTITIONER

## 2017-11-03 RX ORDER — PREDNISONE 10 MG/1
10 TABLET ORAL DAILY
Qty: 42 TABLET | Refills: 0 | Status: SHIPPED | OUTPATIENT
Start: 2017-11-03 | End: 2018-01-04

## 2017-11-03 RX ORDER — CLONAZEPAM 0.5 MG/1
TABLET ORAL
Qty: 30 TABLET | Refills: 0 | Status: SHIPPED | OUTPATIENT
Start: 2017-11-03 | End: 2017-12-20 | Stop reason: SDUPTHER

## 2017-11-03 RX ORDER — PANTOPRAZOLE SODIUM 40 MG/1
TABLET, DELAYED RELEASE ORAL
Qty: 30 TABLET | Refills: 5 | Status: SHIPPED | OUTPATIENT
Start: 2017-11-03 | End: 2018-01-04

## 2017-11-03 RX ORDER — TRAMADOL HYDROCHLORIDE 50 MG/1
50 TABLET ORAL EVERY 6 HOURS PRN
Qty: 60 TABLET | Refills: 2 | Status: SHIPPED | OUTPATIENT
Start: 2017-11-03 | End: 2017-11-13

## 2017-11-03 RX ORDER — ATORVASTATIN CALCIUM 80 MG/1
TABLET, FILM COATED ORAL
Qty: 30 TABLET | Refills: 5 | Status: SHIPPED | OUTPATIENT
Start: 2017-11-03 | End: 2018-04-26 | Stop reason: SDUPTHER

## 2017-11-03 ASSESSMENT — ENCOUNTER SYMPTOMS
EYE PAIN: 0
COUGH: 0
VOMITING: 0
SORE THROAT: 0
ABDOMINAL PAIN: 0
SHORTNESS OF BREATH: 1

## 2017-11-03 NOTE — PROGRESS NOTES
Have you seen any other physician or provider since your last visit? no    Have you had any other diagnostic tests since your last visit? no    Have you changed or stopped any medications since your last visit including any over-the-counter medicines, vitamins, or herbal medicines? no     Are you taking all your prescribed medications? Yes  If NO, why? -  N/A      REVIEW OF SYSTEMS:  Review of Systems   Constitutional: Negative for chills and fever. HENT: Negative for ear pain and sore throat. Eyes: Negative for pain and visual disturbance. Respiratory: Positive for shortness of breath. Negative for cough. Cardiovascular: Negative for chest pain, palpitations and leg swelling. Gastrointestinal: Positive for nausea. Negative for abdominal pain and vomiting. Genitourinary: Negative for dysuria and hematuria. Musculoskeletal: Positive for arthralgias. Negative for joint swelling. Severe pain in hands   Skin: Negative for rash. Neurological: Negative for dizziness and weakness. Psychiatric/Behavioral: Negative for sleep disturbance.

## 2017-11-03 NOTE — PATIENT INSTRUCTIONS
Prednisone taper for breathing and arthritis. Do not take very much Ibuprofen due to being on Prednisone- could hurt your stomach. I went ahead and sent Klonopin- they can fill it when it is due.

## 2017-11-13 ENCOUNTER — HOSPITAL ENCOUNTER (OUTPATIENT)
Dept: OTHER | Age: 77
Discharge: HOME | End: 2017-11-13
Attending: INTERNAL MEDICINE | Admitting: INTERNAL MEDICINE

## 2017-11-13 LAB
A/G RATIO: 1.2 (ref 0.8–2)
ALBUMIN SERPL-MCNC: 3.7 G/DL (ref 3.4–4.8)
ALP BLD-CCNC: 56 U/L (ref 25–100)
ALT SERPL-CCNC: 7 U/L (ref 4–36)
ANION GAP SERPL CALCULATED.3IONS-SCNC: 11 MMOL/L (ref 3–16)
AST SERPL-CCNC: 13 U/L (ref 8–33)
BILIRUB SERPL-MCNC: 0.4 MG/DL (ref 0.3–1.2)
BUN BLDV-MCNC: 22 MG/DL (ref 6–20)
C-REACTIVE PROTEIN: 13.2 MG/L (ref 0–5.1)
CALCIUM SERPL-MCNC: 9.3 MG/DL (ref 8.5–10.5)
CHLORIDE BLD-SCNC: 101 MMOL/L (ref 98–107)
CO2: 27 MMOL/L (ref 20–30)
CREAT SERPL-MCNC: 1 MG/DL (ref 0.4–1.2)
GFR AFRICAN AMERICAN: >59
GFR NON-AFRICAN AMERICAN: 54
GLOBULIN: 3 G/DL
GLUCOSE BLD-MCNC: 102 MG/DL (ref 74–106)
HCT VFR BLD CALC: 34.9 % (ref 37–47)
HEMOGLOBIN: 10.6 G/DL (ref 11.5–16.5)
MCH RBC QN AUTO: 29.7 PG (ref 27–32)
MCHC RBC AUTO-ENTMCNC: 30.4 G/DL (ref 31–35)
MCV RBC AUTO: 97.8 FL (ref 80–100)
PDW BLD-RTO: 14.9 % (ref 11–16)
PLATELET # BLD: 268 K/UL (ref 150–400)
PMV BLD AUTO: 9 FL (ref 6–10)
POTASSIUM SERPL-SCNC: 4.3 MMOL/L (ref 3.4–5.1)
RBC # BLD: 3.57 M/UL (ref 3.8–5.8)
SODIUM BLD-SCNC: 139 MMOL/L (ref 136–145)
TOTAL PROTEIN: 6.7 G/DL (ref 6.4–8.3)
WBC # BLD: 9.8 K/UL (ref 4–11)

## 2017-11-19 ASSESSMENT — ENCOUNTER SYMPTOMS: NAUSEA: 1

## 2017-11-20 RX ORDER — CLONAZEPAM 0.5 MG/1
TABLET ORAL
Qty: 30 TABLET | Refills: 0 | Status: SHIPPED | OUTPATIENT
Start: 2017-11-20 | End: 2018-01-04

## 2017-11-20 RX ORDER — RANITIDINE 300 MG/1
TABLET ORAL
Qty: 60 TABLET | Refills: 5 | Status: SHIPPED | OUTPATIENT
Start: 2017-11-20 | End: 2018-04-26 | Stop reason: SDUPTHER

## 2017-11-20 NOTE — PROGRESS NOTES
SUBJECTIVE:    Patient ID: Laina Phillips is a 77 y.o. female. Medical history Review  Past Medical, Family, and Social History reviewed and does contribute to the patient presenting condition    Health Maintenance Due   Topic Date Due    Colon cancer screen colonoscopy  11/22/1991    Zostavax vaccine  11/22/2001    Smoker: low dose lung CT screening  05/12/2012        HPI:   Chief Complaint   Patient presents with    Anxiety     Patient here today for a follow up. She is sick to her stomach and her pain medication is not working. She is wanting a flu shot today.  Hyperlipidemia       Patient's medications, allergies, past medical, surgical, social and family histories were reviewed and updated as appropriate. Review of Systems Reviewed and acurate. See MA note. OBJECTIVE:  /60 (Site: Right Arm, Position: Sitting, Cuff Size: Medium Adult)   Pulse 89   Wt 124 lb (56.2 kg)   SpO2 93%   BMI 22.68 kg/m²    Physical Exam   Constitutional: She is oriented to person, place, and time. She appears well-developed and well-nourished. No distress. HENT:   Head: Normocephalic. Right Ear: Tympanic membrane normal.   Left Ear: Tympanic membrane normal.   Mouth/Throat: No oropharyngeal exudate. Eyes: Lids are normal.   Neck: Neck supple. Cardiovascular: Normal rate, regular rhythm and normal heart sounds. Pulmonary/Chest: Effort normal. She has decreased breath sounds. Abdominal: Soft. Bowel sounds are normal. She exhibits no distension. There is no tenderness. Musculoskeletal: She exhibits no edema. Right hand: She exhibits tenderness and swelling. Left hand: She exhibits tenderness and swelling. Lymphadenopathy:     She has no cervical adenopathy. Neurological: She is alert and oriented to person, place, and time. Skin: Skin is warm and dry. Psychiatric: She has a normal mood and affect. Vitals reviewed.       Results in Past 30 Days  Result Component Current Result Ref Range Previous Result Ref Range   Alb 3.7 (11/13/2017) 3.4 - 4.8 g/dL Not in Time Range    Albumin/Globulin Ratio 1.2 (11/13/2017) 0.8 - 2.0 Not in Time Range    Alkaline Phosphatase 56 (11/13/2017) 25 - 100 U/L Not in Time Range    ALT 7 (11/13/2017) 4 - 36 U/L Not in Time Range    AST 13 (11/13/2017) 8 - 33 U/L Not in Time Range    BUN 22 (H) (11/13/2017) 6 - 20 mg/dL Not in Time Range    Calcium 9.3 (11/13/2017) 8.5 - 10.5 mg/dL Not in Time Range    Chloride 101 (11/13/2017) 98 - 107 mmol/L Not in Time Range    CO2 27 (11/13/2017) 20 - 30 mmol/L Not in Time Range    CREATININE 1.0 (11/13/2017) 0.4 - 1.2 mg/dL Not in Time Range    GFR  >59 (11/13/2017) >59 Not in Time Range    GFR Non- 54 (L) (11/13/2017) >59 Not in Time Range    Globulin 3.0 (11/13/2017) g/dL Not in Time Range    Glucose 102 (11/13/2017) 74 - 106 mg/dL Not in Time Range    Potassium 4.3 (11/13/2017) 3.4 - 5.1 mmol/L Not in Time Range    Sodium 139 (11/13/2017) 136 - 145 mmol/L Not in Time Range    Total Bilirubin 0.4 (11/13/2017) 0.3 - 1.2 mg/dL Not in Time Range    Total Protein 6.7 (11/13/2017) 6.4 - 8.3 g/dL Not in Time Range        Hemoglobin A1C (%)   Date Value   09/22/2015 5.1     Microscopic Examination (no units)   Date Value   01/01/2017 Not Indicated     LDL Calculated (mg/dL)   Date Value   08/03/2017 54         Lab Results   Component Value Date    WBC 9.8 11/13/2017    NEUTROABS 6.6 08/03/2017    HGB 10.6 11/13/2017    HCT 34.9 11/13/2017    MCV 97.8 11/13/2017     11/13/2017       Lab Results   Component Value Date    TSH 2.43 01/01/2017       Prior to Visit Medications    Medication Sig Taking?  Authorizing Provider   predniSONE (DELTASONE) 10 MG tablet Take 1 tablet by mouth daily 60mg x2d, 50mg x2d, 40mg x2d, 30mg x2d, 20mg x2d, 10mg x2d, then stop Yes JOYCE Gray   pantoprazole (PROTONIX) 40 MG tablet Take 1 tablet by mouth daily Yes JOYCE Gray

## 2017-12-20 RX ORDER — CLONAZEPAM 0.5 MG/1
TABLET ORAL
Qty: 30 TABLET | Refills: 0 | Status: SHIPPED | OUTPATIENT
Start: 2017-12-20 | End: 2018-01-03 | Stop reason: SDUPTHER

## 2017-12-22 ENCOUNTER — TELEPHONE (OUTPATIENT)
Dept: PRIMARY CARE CLINIC | Age: 77
End: 2017-12-22

## 2017-12-22 RX ORDER — ONDANSETRON 4 MG/1
4 TABLET, ORALLY DISINTEGRATING ORAL EVERY 8 HOURS PRN
Qty: 20 TABLET | Refills: 0 | Status: SHIPPED | OUTPATIENT
Start: 2017-12-22 | End: 2018-01-03 | Stop reason: SDUPTHER

## 2017-12-22 RX ORDER — DOCUSATE SODIUM 100 MG/1
100 CAPSULE, LIQUID FILLED ORAL 2 TIMES DAILY
Qty: 60 CAPSULE | Refills: 3 | Status: SHIPPED | OUTPATIENT
Start: 2017-12-22 | End: 2018-04-26 | Stop reason: SDUPTHER

## 2017-12-28 RX ORDER — ISOSORBIDE MONONITRATE 30 MG/1
TABLET, EXTENDED RELEASE ORAL
Qty: 30 TABLET | Refills: 5 | Status: SHIPPED | OUTPATIENT
Start: 2017-12-28 | End: 2018-04-26 | Stop reason: SDUPTHER

## 2018-01-03 ENCOUNTER — TELEPHONE (OUTPATIENT)
Dept: PRIMARY CARE CLINIC | Age: 78
End: 2018-01-03

## 2018-01-03 RX ORDER — POLYETHYLENE GLYCOL 3350 17 G/17G
17 POWDER, FOR SOLUTION ORAL DAILY
Qty: 510 G | Refills: 5 | COMMUNITY
Start: 2018-01-03 | End: 2018-02-02

## 2018-01-03 RX ORDER — CLONAZEPAM 0.5 MG/1
0.5 TABLET ORAL 2 TIMES DAILY PRN
Qty: 60 TABLET | Refills: 0 | Status: SHIPPED | OUTPATIENT
Start: 2018-01-03 | End: 2018-02-08 | Stop reason: SDUPTHER

## 2018-01-03 RX ORDER — ONDANSETRON 4 MG/1
4 TABLET, ORALLY DISINTEGRATING ORAL EVERY 8 HOURS PRN
Qty: 30 TABLET | Refills: 5 | Status: SHIPPED | OUTPATIENT
Start: 2018-01-03 | End: 2018-04-26 | Stop reason: ALTCHOICE

## 2018-01-05 ENCOUNTER — CARE COORDINATION (OUTPATIENT)
Dept: CARE COORDINATION | Age: 78
End: 2018-01-05

## 2018-01-05 NOTE — CARE COORDINATION
Ambulatory Care Coordination  ED Follow up Call    Reason for ED visit:  Sent by OT for possible pneumonia   Status:     improved    Did you call your PCP prior to going to the ED? No      Did you receive a discharge instructions from the Emergency Room? Yes  Review of Instructions:     Understands what to report/when to return?:  Yes   Understands discharge instructions?:  Yes   Following discharge instructions?:  Yes   If not why? na    Are there any new complaints of pain? No  New Pain Meds? No    Constipation prophylaxis needed? N/A    If you have a wound is the dressing clean, dry, and intact? N/A  Understands wound care regimen? N/A    Are there any other complaints/concerns that you wish to tell your provider? Daughter states she is doing well. She had good breakfast and feeling better. She made appointment today with PCP and has antibiotic RX if needed. FU appts/Provider:    Future Appointments  Date Time Provider Brigette Alexander   1/10/2018 1:30 PM JOYCE Lynn [facility]   2/6/2018 1:15 PM JOYCE Lynn [facility]           New Medications?:   Yes      Medication Reconciliation by phone - Yes  Understands Medications? Yes  Taking Medications? Yes  Can you swallow your pills? Yes    Any further needs in the home i.e. Equipment?   No    Link to services in community?:  No   Which services:  na

## 2018-01-09 RX ORDER — UMECLIDINIUM BROMIDE AND VILANTEROL TRIFENATATE 62.5; 25 UG/1; UG/1
POWDER RESPIRATORY (INHALATION)
Qty: 1 EACH | Refills: 5 | Status: SHIPPED | OUTPATIENT
Start: 2018-01-09 | End: 2018-04-26 | Stop reason: SDUPTHER

## 2018-01-16 ENCOUNTER — TELEPHONE (OUTPATIENT)
Dept: PRIMARY CARE CLINIC | Age: 78
End: 2018-01-16

## 2018-01-17 ENCOUNTER — HOSPITAL ENCOUNTER (OUTPATIENT)
Dept: OTHER | Age: 78
Discharge: HOME | End: 2018-01-17
Attending: INTERNAL MEDICINE | Admitting: INTERNAL MEDICINE

## 2018-01-20 LAB
GRAM STAIN RESULT: ABNORMAL
ORGANISM: ABNORMAL
WOUND/ABSCESS: ABNORMAL
WOUND/ABSCESS: ABNORMAL

## 2018-01-22 ENCOUNTER — TELEPHONE (OUTPATIENT)
Dept: PRIMARY CARE CLINIC | Age: 78
End: 2018-01-22

## 2018-01-22 NOTE — TELEPHONE ENCOUNTER
Alberto reported that patient's wound culture was positive for MRSA. They are requesting an antibiotic for the patient.

## 2018-01-23 ENCOUNTER — TELEPHONE (OUTPATIENT)
Dept: PRIMARY CARE CLINIC | Age: 78
End: 2018-01-23

## 2018-01-23 RX ORDER — SULFAMETHOXAZOLE AND TRIMETHOPRIM 800; 160 MG/1; MG/1
1 TABLET ORAL 2 TIMES DAILY
Qty: 20 TABLET | Refills: 0 | Status: SHIPPED | OUTPATIENT
Start: 2018-01-23 | End: 2018-01-25

## 2018-01-25 ENCOUNTER — HOSPITAL ENCOUNTER (OUTPATIENT)
Dept: OTHER | Age: 78
Discharge: HOME | End: 2018-01-25
Attending: NURSE PRACTITIONER | Admitting: NURSE PRACTITIONER

## 2018-01-25 ENCOUNTER — OFFICE VISIT (OUTPATIENT)
Dept: PRIMARY CARE CLINIC | Age: 78
End: 2018-01-25
Payer: MEDICARE

## 2018-01-25 VITALS
WEIGHT: 115.2 LBS | HEART RATE: 64 BPM | SYSTOLIC BLOOD PRESSURE: 110 MMHG | OXYGEN SATURATION: 97 % | BODY MASS INDEX: 21.07 KG/M2 | DIASTOLIC BLOOD PRESSURE: 60 MMHG

## 2018-01-25 DIAGNOSIS — I10 ESSENTIAL HYPERTENSION: ICD-10-CM

## 2018-01-25 DIAGNOSIS — R73.9 HYPERGLYCEMIA: ICD-10-CM

## 2018-01-25 DIAGNOSIS — K59.00 CONSTIPATION, UNSPECIFIED CONSTIPATION TYPE: ICD-10-CM

## 2018-01-25 DIAGNOSIS — D64.9 ANEMIA, UNSPECIFIED TYPE: Primary | ICD-10-CM

## 2018-01-25 DIAGNOSIS — Z93.3 COLOSTOMY PRESENT (HCC): ICD-10-CM

## 2018-01-25 DIAGNOSIS — M06.9 RHEUMATOID ARTHRITIS, INVOLVING UNSPECIFIED SITE, UNSPECIFIED RHEUMATOID FACTOR PRESENCE: ICD-10-CM

## 2018-01-25 DIAGNOSIS — K63.1 COLON PERFORATION (HCC): ICD-10-CM

## 2018-01-25 DIAGNOSIS — D64.9 ANEMIA, UNSPECIFIED TYPE: ICD-10-CM

## 2018-01-25 LAB
A/G RATIO: 1.1 (ref 0.8–2)
ALBUMIN SERPL-MCNC: 3.6 G/DL (ref 3.4–4.8)
ALP BLD-CCNC: 68 U/L (ref 25–100)
ALT SERPL-CCNC: 18 U/L (ref 4–36)
ANION GAP SERPL CALCULATED.3IONS-SCNC: 15 MMOL/L (ref 3–16)
AST SERPL-CCNC: 25 U/L (ref 8–33)
BASOPHILS ABSOLUTE: 0.1 K/UL (ref 0–0.1)
BASOPHILS RELATIVE PERCENT: 0.5 %
BILIRUB SERPL-MCNC: <0.2 MG/DL (ref 0.3–1.2)
BUN BLDV-MCNC: 23 MG/DL (ref 6–20)
CALCIUM SERPL-MCNC: 9.1 MG/DL (ref 8.5–10.5)
CHLORIDE BLD-SCNC: 98 MMOL/L (ref 98–107)
CO2: 25 MMOL/L (ref 20–30)
CREAT SERPL-MCNC: 0.9 MG/DL (ref 0.4–1.2)
EOSINOPHILS ABSOLUTE: 0.2 K/UL (ref 0–0.4)
EOSINOPHILS RELATIVE PERCENT: 1.8 %
FERRITIN: 20.9 NG/ML (ref 22–322)
FOLATE: >20 NG/ML
GFR AFRICAN AMERICAN: >59
GFR NON-AFRICAN AMERICAN: >60
GLOBULIN: 3.3 G/DL
GLUCOSE BLD-MCNC: 113 MG/DL (ref 74–106)
HBA1C MFR BLD: 4.6 %
HCT VFR BLD CALC: 34 % (ref 37–47)
HEMOGLOBIN: 9.8 G/DL (ref 11.5–16.5)
IMMATURE GRANULOCYTES #: 0.1 K/UL
IMMATURE GRANULOCYTES %: 0.7 % (ref 0–5)
IRON: 30 UG/DL (ref 37–145)
LYMPHOCYTES ABSOLUTE: 1.2 K/UL (ref 1.5–4)
LYMPHOCYTES RELATIVE PERCENT: 11.1 %
MCH RBC QN AUTO: 27.4 PG (ref 27–32)
MCHC RBC AUTO-ENTMCNC: 28.8 G/DL (ref 31–35)
MCV RBC AUTO: 95 FL (ref 80–100)
MONOCYTES ABSOLUTE: 0.8 K/UL (ref 0.2–0.8)
MONOCYTES RELATIVE PERCENT: 7.1 %
NEUTROPHILS ABSOLUTE: 8.4 K/UL (ref 2–7.5)
NEUTROPHILS RELATIVE PERCENT: 78.8 %
PDW BLD-RTO: 14.2 % (ref 11–16)
PLATELET # BLD: 340 K/UL (ref 150–400)
PMV BLD AUTO: 10.3 FL (ref 6–10)
POTASSIUM SERPL-SCNC: 4.2 MMOL/L (ref 3.4–5.1)
RBC # BLD: 3.58 M/UL (ref 3.8–5.8)
SODIUM BLD-SCNC: 138 MMOL/L (ref 136–145)
TOTAL IRON BINDING CAPACITY: 350 UG/DL (ref 250–450)
TOTAL PROTEIN: 6.9 G/DL (ref 6.4–8.3)
VITAMIN B-12: 1718 PG/ML (ref 211–911)
WBC # BLD: 10.7 K/UL (ref 4–11)

## 2018-01-25 PROCEDURE — 99214 OFFICE O/P EST MOD 30 MIN: CPT | Performed by: NURSE PRACTITIONER

## 2018-01-25 PROCEDURE — 1090F PRES/ABSN URINE INCON ASSESS: CPT | Performed by: NURSE PRACTITIONER

## 2018-01-25 PROCEDURE — 1036F TOBACCO NON-USER: CPT | Performed by: NURSE PRACTITIONER

## 2018-01-25 PROCEDURE — G8484 FLU IMMUNIZE NO ADMIN: HCPCS | Performed by: NURSE PRACTITIONER

## 2018-01-25 PROCEDURE — 4040F PNEUMOC VAC/ADMIN/RCVD: CPT | Performed by: NURSE PRACTITIONER

## 2018-01-25 PROCEDURE — G8427 DOCREV CUR MEDS BY ELIG CLIN: HCPCS | Performed by: NURSE PRACTITIONER

## 2018-01-25 PROCEDURE — 1123F ACP DISCUSS/DSCN MKR DOCD: CPT | Performed by: NURSE PRACTITIONER

## 2018-01-25 PROCEDURE — G8399 PT W/DXA RESULTS DOCUMENT: HCPCS | Performed by: NURSE PRACTITIONER

## 2018-01-25 PROCEDURE — G8420 CALC BMI NORM PARAMETERS: HCPCS | Performed by: NURSE PRACTITIONER

## 2018-01-25 RX ORDER — DOCUSATE SODIUM 100 MG/1
100 CAPSULE ORAL 2 TIMES DAILY
Qty: 60 CAPSULE | Refills: 5 | COMMUNITY
Start: 2018-01-25 | End: 2018-04-26 | Stop reason: SDUPTHER

## 2018-01-25 RX ORDER — OXYCODONE HYDROCHLORIDE 5 MG/1
5 TABLET ORAL EVERY 4 HOURS PRN
Qty: 18 TABLET | Refills: 0 | Status: SHIPPED | OUTPATIENT
Start: 2018-01-25 | End: 2018-02-13 | Stop reason: SDUPTHER

## 2018-01-25 RX ORDER — DOXYCYCLINE HYCLATE 100 MG
100 TABLET ORAL 2 TIMES DAILY
Qty: 20 TABLET | Refills: 0 | Status: SHIPPED | OUTPATIENT
Start: 2018-01-25 | End: 2018-02-13 | Stop reason: SDUPTHER

## 2018-01-25 ASSESSMENT — ENCOUNTER SYMPTOMS
ABDOMINAL PAIN: 0
SORE THROAT: 0
COUGH: 0
SHORTNESS OF BREATH: 0
NAUSEA: 0
VOMITING: 0
EYE PAIN: 0

## 2018-01-26 ENCOUNTER — TELEPHONE (OUTPATIENT)
Dept: PRIMARY CARE CLINIC | Age: 78
End: 2018-01-26

## 2018-01-26 RX ORDER — LANOLIN ALCOHOL/MO/W.PET/CERES
325 CREAM (GRAM) TOPICAL
Qty: 30 TABLET | Refills: 5 | Status: SHIPPED | OUTPATIENT
Start: 2018-01-26 | End: 2018-04-26 | Stop reason: SDUPTHER

## 2018-02-08 RX ORDER — CLONAZEPAM 0.5 MG/1
0.5 TABLET ORAL 2 TIMES DAILY PRN
Qty: 60 TABLET | Refills: 0 | Status: SHIPPED | OUTPATIENT
Start: 2018-02-08 | End: 2018-03-23 | Stop reason: SDUPTHER

## 2018-02-08 ASSESSMENT — ENCOUNTER SYMPTOMS: CONSTIPATION: 1

## 2018-02-08 NOTE — PROGRESS NOTES
distal portion   Psychiatric: She has a normal mood and affect. Vitals reviewed. Results in Past 30 Days  Result Component Current Result Ref Range Previous Result Ref Range   Alb 3.6 (1/25/2018) 3.4 - 4.8 g/dL Not in Time Range    Albumin/Globulin Ratio 1.1 (1/25/2018) 0.8 - 2.0 Not in Time Range    Alkaline Phosphatase 68 (1/25/2018) 25 - 100 U/L Not in Time Range    ALT 18 (1/25/2018) 4 - 36 U/L Not in Time Range    AST 25 (1/25/2018) 8 - 33 U/L Not in Time Range    BUN 23 (H) (1/25/2018) 6 - 20 mg/dL Not in Time Range    Calcium 9.1 (1/25/2018) 8.5 - 10.5 mg/dL Not in Time Range    Chloride 98 (1/25/2018) 98 - 107 mmol/L Not in Time Range    CO2 25 (1/25/2018) 20 - 30 mmol/L Not in Time Range    CREATININE 0.9 (1/25/2018) 0.4 - 1.2 mg/dL Not in Time Range    GFR  >59 (1/25/2018) >59 Not in Time Range    GFR Non- >60 (1/25/2018) >59 Not in Time Range    Globulin 3.3 (1/25/2018) g/dL Not in Time Range    Glucose 113 (H) (1/25/2018) 74 - 106 mg/dL Not in Time Range    Potassium 4.2 (1/25/2018) 3.4 - 5.1 mmol/L Not in Time Range    Sodium 138 (1/25/2018) 136 - 145 mmol/L Not in Time Range    Total Bilirubin <0.2 (L) (1/25/2018) 0.3 - 1.2 mg/dL Not in Time Range    Total Protein 6.9 (1/25/2018) 6.4 - 8.3 g/dL Not in Time Range        Hemoglobin A1C (%)   Date Value   01/25/2018 4.6     Microscopic Examination (no units)   Date Value   01/01/2017 Not Indicated     LDL Calculated (mg/dL)   Date Value   08/03/2017 54         Lab Results   Component Value Date    WBC 10.7 01/25/2018    NEUTROABS 8.4 01/25/2018    HGB 9.8 01/25/2018    HCT 34.0 01/25/2018    MCV 95.0 01/25/2018     01/25/2018       Lab Results   Component Value Date    TSH 2.43 01/01/2017       Prior to Visit Medications    Medication Sig Taking?  Authorizing Provider   diclofenac sodium 1 % GEL Apply 2 g topically 4 times daily as needed for Pain Yes JOYCE Jimenes   COLACE 100 MG capsule Take 1 capsule

## 2018-02-09 RX ORDER — OXYCODONE HYDROCHLORIDE 5 MG/1
5 TABLET ORAL EVERY 4 HOURS PRN
Qty: 18 TABLET | Refills: 0 | OUTPATIENT
Start: 2018-02-09 | End: 2018-02-12

## 2018-02-13 ENCOUNTER — TELEPHONE (OUTPATIENT)
Dept: PRIMARY CARE CLINIC | Age: 78
End: 2018-02-13

## 2018-02-13 RX ORDER — DOXYCYCLINE HYCLATE 100 MG
100 TABLET ORAL 2 TIMES DAILY
Qty: 20 TABLET | Refills: 0 | Status: SHIPPED | OUTPATIENT
Start: 2018-02-13 | End: 2018-02-23 | Stop reason: SDUPTHER

## 2018-02-13 RX ORDER — OXYCODONE HYDROCHLORIDE 5 MG/1
5 TABLET ORAL EVERY 4 HOURS PRN
Qty: 18 TABLET | Refills: 0 | Status: SHIPPED | OUTPATIENT
Start: 2018-02-13 | End: 2018-02-23 | Stop reason: SDUPTHER

## 2018-02-23 ENCOUNTER — OFFICE VISIT (OUTPATIENT)
Dept: PRIMARY CARE CLINIC | Age: 78
End: 2018-02-23
Payer: MEDICARE

## 2018-02-23 VITALS
SYSTOLIC BLOOD PRESSURE: 90 MMHG | WEIGHT: 119 LBS | DIASTOLIC BLOOD PRESSURE: 60 MMHG | BODY MASS INDEX: 21.77 KG/M2 | OXYGEN SATURATION: 88 % | HEART RATE: 90 BPM

## 2018-02-23 DIAGNOSIS — J44.9 CHRONIC OBSTRUCTIVE PULMONARY DISEASE, UNSPECIFIED COPD TYPE (HCC): ICD-10-CM

## 2018-02-23 DIAGNOSIS — A49.02 MRSA INFECTION: ICD-10-CM

## 2018-02-23 DIAGNOSIS — F09 COGNITIVE DYSFUNCTION: ICD-10-CM

## 2018-02-23 DIAGNOSIS — M06.9 RHEUMATOID ARTHRITIS, INVOLVING UNSPECIFIED SITE, UNSPECIFIED RHEUMATOID FACTOR PRESENCE: ICD-10-CM

## 2018-02-23 DIAGNOSIS — L98.9 SKIN LESION OF FACE: Primary | ICD-10-CM

## 2018-02-23 DIAGNOSIS — D64.9 ANEMIA, UNSPECIFIED TYPE: ICD-10-CM

## 2018-02-23 PROCEDURE — G8926 SPIRO NO PERF OR DOC: HCPCS | Performed by: NURSE PRACTITIONER

## 2018-02-23 PROCEDURE — 4040F PNEUMOC VAC/ADMIN/RCVD: CPT | Performed by: NURSE PRACTITIONER

## 2018-02-23 PROCEDURE — 1123F ACP DISCUSS/DSCN MKR DOCD: CPT | Performed by: NURSE PRACTITIONER

## 2018-02-23 PROCEDURE — 1090F PRES/ABSN URINE INCON ASSESS: CPT | Performed by: NURSE PRACTITIONER

## 2018-02-23 PROCEDURE — G8482 FLU IMMUNIZE ORDER/ADMIN: HCPCS | Performed by: NURSE PRACTITIONER

## 2018-02-23 PROCEDURE — G8420 CALC BMI NORM PARAMETERS: HCPCS | Performed by: NURSE PRACTITIONER

## 2018-02-23 PROCEDURE — 3023F SPIROM DOC REV: CPT | Performed by: NURSE PRACTITIONER

## 2018-02-23 PROCEDURE — 11730 AVULSION NAIL PLATE SIMPLE 1: CPT | Performed by: NURSE PRACTITIONER

## 2018-02-23 PROCEDURE — G8427 DOCREV CUR MEDS BY ELIG CLIN: HCPCS | Performed by: NURSE PRACTITIONER

## 2018-02-23 PROCEDURE — 99214 OFFICE O/P EST MOD 30 MIN: CPT | Performed by: NURSE PRACTITIONER

## 2018-02-23 PROCEDURE — 1036F TOBACCO NON-USER: CPT | Performed by: NURSE PRACTITIONER

## 2018-02-23 PROCEDURE — G8399 PT W/DXA RESULTS DOCUMENT: HCPCS | Performed by: NURSE PRACTITIONER

## 2018-02-23 RX ORDER — OXYCODONE HYDROCHLORIDE 5 MG/1
5 TABLET ORAL EVERY 4 HOURS PRN
Qty: 18 TABLET | Refills: 0 | Status: SHIPPED | OUTPATIENT
Start: 2018-02-23 | End: 2018-03-13 | Stop reason: SDUPTHER

## 2018-02-23 RX ORDER — DOXYCYCLINE HYCLATE 100 MG
100 TABLET ORAL 2 TIMES DAILY
Qty: 60 TABLET | Refills: 0 | Status: SHIPPED | OUTPATIENT
Start: 2018-02-23 | End: 2018-04-26 | Stop reason: SDUPTHER

## 2018-02-23 ASSESSMENT — ENCOUNTER SYMPTOMS
VOMITING: 0
COUGH: 0
SORE THROAT: 0
ABDOMINAL PAIN: 0
SHORTNESS OF BREATH: 1
NAUSEA: 1
EYE PAIN: 0

## 2018-03-02 ENCOUNTER — OFFICE VISIT (OUTPATIENT)
Dept: PRIMARY CARE CLINIC | Age: 78
End: 2018-03-02
Payer: MEDICARE

## 2018-03-02 VITALS
OXYGEN SATURATION: 95 % | SYSTOLIC BLOOD PRESSURE: 110 MMHG | BODY MASS INDEX: 21.36 KG/M2 | WEIGHT: 116.8 LBS | DIASTOLIC BLOOD PRESSURE: 70 MMHG | HEART RATE: 103 BPM

## 2018-03-02 DIAGNOSIS — A49.02 MRSA INFECTION: Primary | ICD-10-CM

## 2018-03-02 DIAGNOSIS — Z93.3 COLOSTOMY PRESENT (HCC): ICD-10-CM

## 2018-03-02 DIAGNOSIS — F09 COGNITIVE DYSFUNCTION: ICD-10-CM

## 2018-03-02 PROCEDURE — G8420 CALC BMI NORM PARAMETERS: HCPCS | Performed by: NURSE PRACTITIONER

## 2018-03-02 PROCEDURE — G8399 PT W/DXA RESULTS DOCUMENT: HCPCS | Performed by: NURSE PRACTITIONER

## 2018-03-02 PROCEDURE — 1036F TOBACCO NON-USER: CPT | Performed by: NURSE PRACTITIONER

## 2018-03-02 PROCEDURE — G8482 FLU IMMUNIZE ORDER/ADMIN: HCPCS | Performed by: NURSE PRACTITIONER

## 2018-03-02 PROCEDURE — 99213 OFFICE O/P EST LOW 20 MIN: CPT | Performed by: NURSE PRACTITIONER

## 2018-03-02 PROCEDURE — 4040F PNEUMOC VAC/ADMIN/RCVD: CPT | Performed by: NURSE PRACTITIONER

## 2018-03-02 PROCEDURE — 1090F PRES/ABSN URINE INCON ASSESS: CPT | Performed by: NURSE PRACTITIONER

## 2018-03-02 PROCEDURE — 1123F ACP DISCUSS/DSCN MKR DOCD: CPT | Performed by: NURSE PRACTITIONER

## 2018-03-02 PROCEDURE — G8427 DOCREV CUR MEDS BY ELIG CLIN: HCPCS | Performed by: NURSE PRACTITIONER

## 2018-03-05 RX ORDER — DOXEPIN HYDROCHLORIDE 50 MG/1
CAPSULE ORAL
Qty: 30 CAPSULE | Refills: 5 | Status: SHIPPED | OUTPATIENT
Start: 2018-03-05 | End: 2018-04-26 | Stop reason: SDUPTHER

## 2018-03-08 ENCOUNTER — TELEPHONE (OUTPATIENT)
Dept: PRIMARY CARE CLINIC | Age: 78
End: 2018-03-08

## 2018-03-08 NOTE — TELEPHONE ENCOUNTER
Spoke with her daughter, Chicho Angel. She feels like her dementia is getting a lot worse and she is unable to take care of herself. She is living with her daughter currently but she states she will not be able to care for her much longer because her mother is uncooperate and stays angry at her. I informed her that she could call the nursing home and discuss a possible admission. She may have to go through a competency hearing. Unless she has an acute medical problem, we cannot justify an acute care admission. I also suggested she look into [facility], where they had a dementia unit at one point, and call Adult Protective Services which she has already done.

## 2018-03-13 RX ORDER — OXYCODONE HYDROCHLORIDE 5 MG/1
5 TABLET ORAL EVERY 4 HOURS PRN
Qty: 18 TABLET | Refills: 0 | Status: SHIPPED | OUTPATIENT
Start: 2018-03-13 | End: 2018-03-16

## 2018-03-13 RX ORDER — PREDNISONE 1 MG/1
3 TABLET ORAL DAILY
Qty: 90 TABLET | Refills: 5 | Status: SHIPPED | OUTPATIENT
Start: 2018-03-13 | End: 2018-04-26 | Stop reason: SDUPTHER

## 2018-03-13 RX ORDER — OXYCODONE HYDROCHLORIDE 5 MG/1
5 TABLET ORAL EVERY 4 HOURS PRN
Qty: 18 TABLET | Refills: 0 | Status: SHIPPED | OUTPATIENT
Start: 2018-03-13 | End: 2018-03-13 | Stop reason: SDUPTHER

## 2018-03-19 NOTE — PROGRESS NOTES
SUBJECTIVE:    Patient ID: Zahira Cordova is a 69 y.o. female. Medical history Review  Past Medical, Family, and Social History reviewed and does contribute to the patient presenting condition    Health Maintenance Due   Topic Date Due    Shingles Vaccine (1 of 2 - 2 Dose Series) 11/22/1991    Smoker: low dose lung CT screening  05/12/2012        HPI:   Chief Complaint   Patient presents with    Anemia     Patient here today for a follow up on anemia. Patient is complaining of nauseated. She is also having trouble breathing today. Her left 4th nailbed continues to have drainage and odor. It gets better with the Doxycycline but has not cleared. Her daughter is having a hard time getting her to eat. She is having a flare up of the arthritis in her hands as well. Patient's medications, allergies, past medical, surgical, social and family histories were reviewed and updated as appropriate. Review of Systems Reviewed and acurate. See MA note. OBJECTIVE:  BP 90/60 (Site: Right Arm, Position: Sitting, Cuff Size: Medium Adult)   Pulse 90   Wt 119 lb (54 kg)   SpO2 (!) 88%   BMI 21.77 kg/m²    Physical Exam   Constitutional: She is oriented to person, place, and time. She appears well-developed and well-nourished. No distress. HENT:   Head: Normocephalic. Right Ear: Tympanic membrane normal.   Left Ear: Tympanic membrane normal.   Mouth/Throat: No oropharyngeal exudate. Eyes: Lids are normal.   Neck: Neck supple. Cardiovascular: Normal rate, regular rhythm and normal heart sounds. Pulmonary/Chest: Effort normal. She has decreased breath sounds. Abdominal: Soft. Bowel sounds are normal. She exhibits no distension. There is no tenderness. Musculoskeletal: She exhibits no edema. Lymphadenopathy:     She has no cervical adenopathy. Neurological: She is alert and oriented to person, place, and time. Skin: Skin is warm and dry.    Erythematous, crusted skin lesion to the arthritis, involving unspecified site, unspecified rheumatoid factor presence ([facility] [address]    5. Cognitive dysfunction    6. MRSA infection          PLAN:    Orders Placed This Encounter   Medications    doxycycline hyclate (VIBRA-TABS) 100 MG tablet     Sig: Take 1 tablet by mouth 2 times daily     Dispense:  60 tablet     Refill:  0    DISCONTD: oxyCODONE (ROXICODONE) 5 MG immediate release tablet     Sig: Take 1 tablet by mouth every 4 hours as needed for Pain for up to 3 days . Take lowest dose possible to manage pain. Dispense:  18 tablet     Refill:  0    mupirocin (BACTROBAN) 2 % ointment     Sig: Apply 3 times daily. Dispense:  30 g     Refill:  2     Orders Placed This Encounter   Procedures    Amb External Referral To Dermatology    91643 - [place] REMOVAL OF NAIL PLATE     Patient Instructions   Increase Prednisone- 40mg daily for 2 days, 30mg daily for 2 days, 20mg daily for 2 days, 10mg daily for 2 days, 5mg daily for 2 days, then resume 3mg daily. Return in about 1 week (around 3/2/2018). Wash nail with soap and water daily. Keep covered when out or busy.

## 2018-03-22 ENCOUNTER — TELEPHONE (OUTPATIENT)
Dept: PRIMARY CARE CLINIC | Age: 78
End: 2018-03-22

## 2018-03-26 RX ORDER — CLONAZEPAM 0.5 MG/1
0.5 TABLET ORAL 2 TIMES DAILY PRN
Qty: 60 TABLET | Refills: 0 | Status: SHIPPED | OUTPATIENT
Start: 2018-03-26 | End: 2018-04-26 | Stop reason: ALTCHOICE

## 2018-03-27 NOTE — PROGRESS NOTES
SUBJECTIVE:    Patient ID: Magi Hall is a 69 y.o. female. Medical history Review  Past Medical, Family, and Social History reviewed and does contribute to the patient presenting condition    Health Maintenance Due   Topic Date Due    Shingles Vaccine (1 of 2 - 2 Dose Series) 11/22/1991    Smoker: low dose lung CT screening  05/12/2012        HPI:   Chief Complaint   Patient presents with    Wound Check     Patient here today for a follow up on her finger. She states it has a hard place on it on the left side. She states her daughter wants to put her in the nursing home. Patient's medications, allergies, past medical, surgical, social and family histories were reviewed and updated as appropriate. Review of Systems Reviewed and acurate. See MA note. OBJECTIVE:  /70 (Site: Left Arm, Position: Sitting, Cuff Size: Medium Adult)   Pulse 103   Wt 116 lb 12.8 oz (53 kg)   SpO2 95%   BMI 21.36 kg/m²    Physical Exam   Constitutional: She is oriented to person, place, and time. She appears well-developed and well-nourished. No distress. HENT:   Head: Normocephalic. Right Ear: Tympanic membrane normal.   Left Ear: Tympanic membrane normal.   Mouth/Throat: No oropharyngeal exudate. Eyes: Lids are normal.   Neck: Neck supple. Cardiovascular: Normal rate, regular rhythm and normal heart sounds. Pulmonary/Chest: Effort normal. She has decreased breath sounds. Abdominal: Soft. Bowel sounds are normal. She exhibits no distension. There is no tenderness. Musculoskeletal: She exhibits no edema. Lymphadenopathy:     She has no cervical adenopathy. Neurological: She is alert and oriented to person, place, and time. Skin: Skin is warm and dry. Nail bed healing, no drainage or erythema   Psychiatric: She has a normal mood and affect. Vitals reviewed. No results found for requested labs within last 30 days.        Hemoglobin A1C (%)   Date Value   01/25/2018 4.6 1 TABLET BY MOUTH ONCE DAILY FOR ALLERGIES Yes JOYCE Bates   triamterene-hydrochlorothiazide (MAXZIDE-25) 37.5-25 MG per tablet Take 1 tablet by mouth daily Yes JOYCE Bates   folic acid (FOLVITE) 1 MG tablet Take 1 tablet by mouth daily Yes JOYCE Bates   Multiple Vitamins-Minerals (ICAPS AREDS 2) CAPS Take by mouth Yes Historical Provider, MD   Spacer/Aero-Holding Chambers (AEROCHAMBER MV) MISC 1 Device by Does not apply route every 4 hours as needed. Yes JOYCE Bates   Incontinence Supplies MISC Incontinence pads Yes JOYCE Bates   acetaminophen (TYLENOL) 500 MG tablet   Take 1,000 mg by mouth every 4 hours as needed  Yes Historical Provider, MD   Nebulizers ([address]) MISC by Does not apply route. Yes Salma Maya MD   clonazePAM (KLONOPIN) 0.5 MG tablet Take 1 tablet by mouth 2 times daily as needed for Anxiety for up to 30 days Take 1 tablet by mouth nightly as needed (sleep). JOYCE Bates   predniSONE (DELTASONE) 1 MG tablet Take 3 tablets by mouth daily  JOYCE Bates   doxepin (SINEQUAN) 50 MG capsule TAKE 1 CAPSULE BY MOUTH AT BEDTIME FOR DEPRESSION AND SLEEP  JOYCE Bates       ASSESSMENT:  1. MRSA infection    2. Cognitive dysfunction    3. Colostomy present [facility])          PLAN:      Patient Instructions   Mupirocin ointment three times a day. Return in about 4 weeks (around 3/30/2018). Advised to talk to other family members about her living conditions, as she needs assistance at this time.

## 2018-04-25 ENCOUNTER — OFFICE VISIT (OUTPATIENT)
Dept: PRIMARY CARE CLINIC | Age: 78
End: 2018-04-25
Payer: MEDICARE

## 2018-04-25 VITALS
SYSTOLIC BLOOD PRESSURE: 110 MMHG | DIASTOLIC BLOOD PRESSURE: 70 MMHG | HEART RATE: 91 BPM | BODY MASS INDEX: 20.67 KG/M2 | WEIGHT: 113 LBS | OXYGEN SATURATION: 92 %

## 2018-04-25 DIAGNOSIS — J44.9 CHRONIC OBSTRUCTIVE PULMONARY DISEASE, UNSPECIFIED COPD TYPE (HCC): ICD-10-CM

## 2018-04-25 DIAGNOSIS — F09 COGNITIVE DYSFUNCTION: ICD-10-CM

## 2018-04-25 DIAGNOSIS — M06.9 RHEUMATOID ARTHRITIS, INVOLVING UNSPECIFIED SITE, UNSPECIFIED RHEUMATOID FACTOR PRESENCE: ICD-10-CM

## 2018-04-25 DIAGNOSIS — A49.02 MRSA INFECTION: Primary | ICD-10-CM

## 2018-04-25 DIAGNOSIS — Z93.3 COLOSTOMY PRESENT (HCC): ICD-10-CM

## 2018-04-25 DIAGNOSIS — L98.9 SKIN LESION OF FACE: ICD-10-CM

## 2018-04-25 DIAGNOSIS — K59.00 CONSTIPATION, UNSPECIFIED CONSTIPATION TYPE: ICD-10-CM

## 2018-04-25 PROCEDURE — G8926 SPIRO NO PERF OR DOC: HCPCS | Performed by: NURSE PRACTITIONER

## 2018-04-25 PROCEDURE — G8399 PT W/DXA RESULTS DOCUMENT: HCPCS | Performed by: NURSE PRACTITIONER

## 2018-04-25 PROCEDURE — 1123F ACP DISCUSS/DSCN MKR DOCD: CPT | Performed by: NURSE PRACTITIONER

## 2018-04-25 PROCEDURE — G8427 DOCREV CUR MEDS BY ELIG CLIN: HCPCS | Performed by: NURSE PRACTITIONER

## 2018-04-25 PROCEDURE — 4040F PNEUMOC VAC/ADMIN/RCVD: CPT | Performed by: NURSE PRACTITIONER

## 2018-04-25 PROCEDURE — 1090F PRES/ABSN URINE INCON ASSESS: CPT | Performed by: NURSE PRACTITIONER

## 2018-04-25 PROCEDURE — 99214 OFFICE O/P EST MOD 30 MIN: CPT | Performed by: NURSE PRACTITIONER

## 2018-04-25 PROCEDURE — 3023F SPIROM DOC REV: CPT | Performed by: NURSE PRACTITIONER

## 2018-04-25 PROCEDURE — G8420 CALC BMI NORM PARAMETERS: HCPCS | Performed by: NURSE PRACTITIONER

## 2018-04-25 PROCEDURE — 1036F TOBACCO NON-USER: CPT | Performed by: NURSE PRACTITIONER

## 2018-04-25 ASSESSMENT — ENCOUNTER SYMPTOMS
ABDOMINAL PAIN: 0
SORE THROAT: 0
NAUSEA: 0
COUGH: 0
SHORTNESS OF BREATH: 1
VOMITING: 0
EYE PAIN: 0

## 2018-04-26 RX ORDER — DOXEPIN HYDROCHLORIDE 50 MG/1
CAPSULE ORAL
Qty: 30 CAPSULE | Refills: 5 | Status: SHIPPED | OUTPATIENT
Start: 2018-04-26 | End: 2018-10-25 | Stop reason: SDUPTHER

## 2018-04-26 RX ORDER — ATORVASTATIN CALCIUM 80 MG/1
TABLET, FILM COATED ORAL
Qty: 30 TABLET | Refills: 5 | Status: SHIPPED | OUTPATIENT
Start: 2018-04-26 | End: 2018-10-25 | Stop reason: SDUPTHER

## 2018-04-26 RX ORDER — DOXYCYCLINE HYCLATE 100 MG
100 TABLET ORAL 2 TIMES DAILY
Qty: 60 TABLET | Refills: 0 | Status: SHIPPED | OUTPATIENT
Start: 2018-04-26 | End: 2018-06-06

## 2018-04-26 RX ORDER — POLYETHYLENE GLYCOL 3350 17 G/17G
17 POWDER, FOR SOLUTION ORAL DAILY
Qty: 510 G | Refills: 5 | COMMUNITY
Start: 2018-04-26 | End: 2018-05-26

## 2018-04-26 RX ORDER — DOCUSATE SODIUM 100 MG/1
100 CAPSULE ORAL 2 TIMES DAILY
Qty: 60 CAPSULE | Refills: 5 | Status: SHIPPED | OUTPATIENT
Start: 2018-04-26

## 2018-04-26 RX ORDER — TRIAMTERENE AND HYDROCHLOROTHIAZIDE 37.5; 25 MG/1; MG/1
TABLET ORAL
Qty: 30 TABLET | Refills: 5 | Status: SHIPPED | OUTPATIENT
Start: 2018-04-26 | End: 2018-10-25 | Stop reason: SDUPTHER

## 2018-04-26 RX ORDER — PREDNISONE 1 MG/1
3 TABLET ORAL DAILY
Qty: 90 TABLET | Refills: 5 | Status: SHIPPED | OUTPATIENT
Start: 2018-04-26 | End: 2018-11-23 | Stop reason: SDUPTHER

## 2018-04-26 RX ORDER — CETIRIZINE HYDROCHLORIDE 10 MG/1
TABLET ORAL
Qty: 30 TABLET | Refills: 5 | Status: SHIPPED | OUTPATIENT
Start: 2018-04-26

## 2018-04-26 RX ORDER — IPRATROPIUM BROMIDE AND ALBUTEROL SULFATE 2.5; .5 MG/3ML; MG/3ML
SOLUTION RESPIRATORY (INHALATION)
Qty: 360 ML | Refills: 5 | Status: SHIPPED | OUTPATIENT
Start: 2018-04-26 | End: 2018-05-10 | Stop reason: SDUPTHER

## 2018-04-26 RX ORDER — ISOSORBIDE MONONITRATE 30 MG/1
TABLET, EXTENDED RELEASE ORAL
Qty: 30 TABLET | Refills: 5 | Status: SHIPPED | OUTPATIENT
Start: 2018-04-26 | End: 2018-10-25 | Stop reason: SDUPTHER

## 2018-04-26 RX ORDER — LANOLIN ALCOHOL/MO/W.PET/CERES
325 CREAM (GRAM) TOPICAL
Qty: 30 TABLET | Refills: 5 | Status: SHIPPED | OUTPATIENT
Start: 2018-04-26 | End: 2018-08-03 | Stop reason: SDUPTHER

## 2018-04-26 RX ORDER — FOLIC ACID 1 MG/1
TABLET ORAL
Qty: 30 TABLET | Refills: 5 | Status: SHIPPED | OUTPATIENT
Start: 2018-04-26

## 2018-04-26 RX ORDER — ALBUTEROL SULFATE 90 UG/1
2 AEROSOL, METERED RESPIRATORY (INHALATION) EVERY 6 HOURS PRN
Qty: 8.5 G | Refills: 5 | Status: SHIPPED | OUTPATIENT
Start: 2018-04-26

## 2018-04-26 RX ORDER — RANITIDINE 300 MG/1
TABLET ORAL
Qty: 60 TABLET | Refills: 5 | Status: SHIPPED | OUTPATIENT
Start: 2018-04-26 | End: 2018-10-25 | Stop reason: SDUPTHER

## 2018-04-27 ENCOUNTER — TELEPHONE (OUTPATIENT)
Dept: PRIMARY CARE CLINIC | Age: 78
End: 2018-04-27

## 2018-04-27 DIAGNOSIS — R53.81 DEBILITY: ICD-10-CM

## 2018-04-27 DIAGNOSIS — S22.000A THORACIC COMPRESSION FRACTURE, CLOSED, INITIAL ENCOUNTER (HCC): ICD-10-CM

## 2018-04-27 DIAGNOSIS — F09 COGNITIVE DYSFUNCTION: ICD-10-CM

## 2018-04-27 DIAGNOSIS — Z93.3 COLOSTOMY PRESENT (HCC): ICD-10-CM

## 2018-04-27 DIAGNOSIS — M06.9 RHEUMATOID ARTHRITIS, INVOLVING UNSPECIFIED SITE, UNSPECIFIED RHEUMATOID FACTOR PRESENCE: ICD-10-CM

## 2018-04-27 DIAGNOSIS — A49.02 MRSA INFECTION: Primary | ICD-10-CM

## 2018-04-27 DIAGNOSIS — J44.9 CHRONIC OBSTRUCTIVE PULMONARY DISEASE, UNSPECIFIED COPD TYPE (HCC): ICD-10-CM

## 2018-04-30 ASSESSMENT — ENCOUNTER SYMPTOMS: CONSTIPATION: 1

## 2018-05-10 RX ORDER — IPRATROPIUM BROMIDE AND ALBUTEROL SULFATE 2.5; .5 MG/3ML; MG/3ML
SOLUTION RESPIRATORY (INHALATION)
Qty: 360 ML | Refills: 5 | Status: SHIPPED | OUTPATIENT
Start: 2018-05-10 | End: 2018-07-23 | Stop reason: SDUPTHER

## 2018-06-01 ENCOUNTER — OFFICE VISIT (OUTPATIENT)
Dept: PRIMARY CARE CLINIC | Age: 78
End: 2018-06-01
Payer: MEDICARE

## 2018-06-01 ENCOUNTER — HOSPITAL ENCOUNTER (OUTPATIENT)
Dept: OTHER | Age: 78
Discharge: HOME | End: 2018-06-01
Attending: NURSE PRACTITIONER | Admitting: NURSE PRACTITIONER

## 2018-06-01 VITALS
OXYGEN SATURATION: 91 % | BODY MASS INDEX: 19.79 KG/M2 | WEIGHT: 108.2 LBS | DIASTOLIC BLOOD PRESSURE: 70 MMHG | SYSTOLIC BLOOD PRESSURE: 110 MMHG | HEART RATE: 88 BPM

## 2018-06-01 DIAGNOSIS — J44.9 CHRONIC OBSTRUCTIVE PULMONARY DISEASE, UNSPECIFIED COPD TYPE (HCC): ICD-10-CM

## 2018-06-01 DIAGNOSIS — E78.5 HYPERLIPIDEMIA, UNSPECIFIED HYPERLIPIDEMIA TYPE: ICD-10-CM

## 2018-06-01 DIAGNOSIS — E61.1 IRON DEFICIENCY: Primary | ICD-10-CM

## 2018-06-01 DIAGNOSIS — M06.9 RHEUMATOID ARTHRITIS, INVOLVING UNSPECIFIED SITE, UNSPECIFIED RHEUMATOID FACTOR PRESENCE: ICD-10-CM

## 2018-06-01 DIAGNOSIS — S61.209D FINGER WOUND, SIMPLE, OPEN, SUBSEQUENT ENCOUNTER: ICD-10-CM

## 2018-06-01 DIAGNOSIS — Z93.3 COLOSTOMY PRESENT (HCC): ICD-10-CM

## 2018-06-01 DIAGNOSIS — I10 ESSENTIAL HYPERTENSION: ICD-10-CM

## 2018-06-01 DIAGNOSIS — E61.1 IRON DEFICIENCY: ICD-10-CM

## 2018-06-01 LAB
A/G RATIO: 1.2 (ref 0.8–2)
ALBUMIN SERPL-MCNC: 3.6 G/DL (ref 3.4–4.8)
ALP BLD-CCNC: 69 U/L (ref 25–100)
ALT SERPL-CCNC: 7 U/L (ref 4–36)
ANION GAP SERPL CALCULATED.3IONS-SCNC: 15 MMOL/L (ref 3–16)
AST SERPL-CCNC: 17 U/L (ref 8–33)
BASOPHILS ABSOLUTE: 0 K/UL (ref 0–0.1)
BASOPHILS RELATIVE PERCENT: 0.2 %
BILIRUB SERPL-MCNC: 0.4 MG/DL (ref 0.3–1.2)
BUN BLDV-MCNC: 16 MG/DL (ref 6–20)
CALCIUM SERPL-MCNC: 9.3 MG/DL (ref 8.5–10.5)
CHLORIDE BLD-SCNC: 99 MMOL/L (ref 98–107)
CHOLESTEROL, TOTAL: 141 MG/DL (ref 0–200)
CO2: 27 MMOL/L (ref 20–30)
CREAT SERPL-MCNC: 1.3 MG/DL (ref 0.4–1.2)
EOSINOPHILS ABSOLUTE: 0.1 K/UL (ref 0–0.4)
EOSINOPHILS RELATIVE PERCENT: 1.4 %
FERRITIN: 82.6 NG/ML (ref 22–322)
GFR AFRICAN AMERICAN: 48
GFR NON-AFRICAN AMERICAN: 40
GLOBULIN: 2.9 G/DL
GLUCOSE BLD-MCNC: 96 MG/DL (ref 74–106)
HCT VFR BLD CALC: 36.3 % (ref 37–47)
HDLC SERPL-MCNC: 71 MG/DL (ref 40–60)
HEMOGLOBIN: 10.8 G/DL (ref 11.5–16.5)
IMMATURE GRANULOCYTES #: 0 K/UL
IMMATURE GRANULOCYTES %: 0.3 % (ref 0–5)
IRON: 36 UG/DL (ref 37–145)
LDL CHOLESTEROL CALCULATED: 48 MG/DL
LYMPHOCYTES ABSOLUTE: 1.4 K/UL (ref 1.5–4)
LYMPHOCYTES RELATIVE PERCENT: 16.1 %
MCH RBC QN AUTO: 27.3 PG (ref 27–32)
MCHC RBC AUTO-ENTMCNC: 29.8 G/DL (ref 31–35)
MCV RBC AUTO: 91.7 FL (ref 80–100)
MONOCYTES ABSOLUTE: 0.7 K/UL (ref 0.2–0.8)
MONOCYTES RELATIVE PERCENT: 8.4 %
NEUTROPHILS ABSOLUTE: 6.5 K/UL (ref 2–7.5)
NEUTROPHILS RELATIVE PERCENT: 73.6 %
PDW BLD-RTO: 14.8 % (ref 11–16)
PLATELET # BLD: 309 K/UL (ref 150–400)
PMV BLD AUTO: 10.3 FL (ref 6–10)
POTASSIUM SERPL-SCNC: 4 MMOL/L (ref 3.4–5.1)
RBC # BLD: 3.96 M/UL (ref 3.8–5.8)
SODIUM BLD-SCNC: 141 MMOL/L (ref 136–145)
TOTAL IRON BINDING CAPACITY: 190 UG/DL (ref 250–450)
TOTAL PROTEIN: 6.5 G/DL (ref 6.4–8.3)
TRIGL SERPL-MCNC: 110 MG/DL (ref 0–249)
VLDLC SERPL CALC-MCNC: 22 MG/DL
WBC # BLD: 8.8 K/UL (ref 4–11)

## 2018-06-01 PROCEDURE — G8399 PT W/DXA RESULTS DOCUMENT: HCPCS | Performed by: NURSE PRACTITIONER

## 2018-06-01 PROCEDURE — G8427 DOCREV CUR MEDS BY ELIG CLIN: HCPCS | Performed by: NURSE PRACTITIONER

## 2018-06-01 PROCEDURE — 1036F TOBACCO NON-USER: CPT | Performed by: NURSE PRACTITIONER

## 2018-06-01 PROCEDURE — 4040F PNEUMOC VAC/ADMIN/RCVD: CPT | Performed by: NURSE PRACTITIONER

## 2018-06-01 PROCEDURE — G8420 CALC BMI NORM PARAMETERS: HCPCS | Performed by: NURSE PRACTITIONER

## 2018-06-01 PROCEDURE — G8926 SPIRO NO PERF OR DOC: HCPCS | Performed by: NURSE PRACTITIONER

## 2018-06-01 PROCEDURE — 1123F ACP DISCUSS/DSCN MKR DOCD: CPT | Performed by: NURSE PRACTITIONER

## 2018-06-01 PROCEDURE — 1090F PRES/ABSN URINE INCON ASSESS: CPT | Performed by: NURSE PRACTITIONER

## 2018-06-01 PROCEDURE — 99213 OFFICE O/P EST LOW 20 MIN: CPT | Performed by: NURSE PRACTITIONER

## 2018-06-01 PROCEDURE — 3023F SPIROM DOC REV: CPT | Performed by: NURSE PRACTITIONER

## 2018-06-01 RX ORDER — TRAMADOL HYDROCHLORIDE 50 MG/1
TABLET ORAL
Refills: 2 | COMMUNITY
Start: 2018-04-20 | End: 2018-07-19 | Stop reason: SDUPTHER

## 2018-06-01 RX ORDER — IBUPROFEN 600 MG/1
TABLET ORAL
Refills: 3 | COMMUNITY
Start: 2018-04-02

## 2018-06-06 ENCOUNTER — TELEPHONE (OUTPATIENT)
Dept: PRIMARY CARE CLINIC | Age: 78
End: 2018-06-06

## 2018-06-06 LAB
ANAEROBIC CULTURE: ABNORMAL
GRAM STAIN RESULT: ABNORMAL
ORGANISM: ABNORMAL
ORGANISM: ABNORMAL
WOUND/ABSCESS: ABNORMAL
WOUND/ABSCESS: ABNORMAL

## 2018-06-06 RX ORDER — CEPHALEXIN 500 MG/1
500 CAPSULE ORAL 2 TIMES DAILY
Qty: 20 CAPSULE | Refills: 0 | Status: SHIPPED | OUTPATIENT
Start: 2018-06-06 | End: 2018-10-03

## 2018-06-17 ASSESSMENT — ENCOUNTER SYMPTOMS
NAUSEA: 0
EYE PAIN: 0
SORE THROAT: 0
ABDOMINAL PAIN: 0
SHORTNESS OF BREATH: 0
ROS SKIN COMMENTS: FINGER INFECTION
VOMITING: 0
COUGH: 0

## 2018-07-19 DIAGNOSIS — M06.9 RHEUMATOID ARTHRITIS, INVOLVING UNSPECIFIED SITE, UNSPECIFIED RHEUMATOID FACTOR PRESENCE: Primary | ICD-10-CM

## 2018-07-19 RX ORDER — TRAMADOL HYDROCHLORIDE 50 MG/1
50 TABLET ORAL EVERY 6 HOURS PRN
Qty: 60 TABLET | Refills: 2 | Status: SHIPPED | OUTPATIENT
Start: 2018-07-19 | End: 2018-12-12 | Stop reason: SDUPTHER

## 2018-07-24 RX ORDER — IPRATROPIUM BROMIDE AND ALBUTEROL SULFATE 2.5; .5 MG/3ML; MG/3ML
SOLUTION RESPIRATORY (INHALATION)
Qty: 360 ML | Refills: 5 | Status: SHIPPED | OUTPATIENT
Start: 2018-07-24

## 2018-08-01 ENCOUNTER — HOSPITAL ENCOUNTER (OUTPATIENT)
Facility: HOSPITAL | Age: 78
Discharge: HOME | End: 2018-08-01
Payer: MEDICARE

## 2018-08-01 ENCOUNTER — OFFICE VISIT (OUTPATIENT)
Dept: PRIMARY CARE CLINIC | Age: 78
End: 2018-08-01
Payer: MEDICARE

## 2018-08-01 VITALS
BODY MASS INDEX: 20.3 KG/M2 | WEIGHT: 111 LBS | DIASTOLIC BLOOD PRESSURE: 60 MMHG | HEART RATE: 115 BPM | SYSTOLIC BLOOD PRESSURE: 100 MMHG | OXYGEN SATURATION: 94 %

## 2018-08-01 DIAGNOSIS — I10 ESSENTIAL HYPERTENSION: ICD-10-CM

## 2018-08-01 DIAGNOSIS — J44.9 CHRONIC OBSTRUCTIVE PULMONARY DISEASE, UNSPECIFIED COPD TYPE (HCC): ICD-10-CM

## 2018-08-01 DIAGNOSIS — M06.9 RHEUMATOID ARTHRITIS, INVOLVING UNSPECIFIED SITE, UNSPECIFIED RHEUMATOID FACTOR PRESENCE: Primary | ICD-10-CM

## 2018-08-01 DIAGNOSIS — E61.1 IRON DEFICIENCY: ICD-10-CM

## 2018-08-01 LAB
A/G RATIO: 1.1 (ref 0.8–2)
ALBUMIN SERPL-MCNC: 3.2 G/DL (ref 3.4–4.8)
ALP BLD-CCNC: 48 U/L (ref 25–100)
ALT SERPL-CCNC: 6 U/L (ref 4–36)
ANION GAP SERPL CALCULATED.3IONS-SCNC: 14 MMOL/L (ref 3–16)
AST SERPL-CCNC: 13 U/L (ref 8–33)
BASOPHILS ABSOLUTE: 0 K/UL (ref 0–0.1)
BASOPHILS RELATIVE PERCENT: 0.3 %
BILIRUB SERPL-MCNC: <0.2 MG/DL (ref 0.3–1.2)
BUN BLDV-MCNC: 23 MG/DL (ref 6–20)
CALCIUM SERPL-MCNC: 9 MG/DL (ref 8.5–10.5)
CHLORIDE BLD-SCNC: 106 MMOL/L (ref 98–107)
CO2: 25 MMOL/L (ref 20–30)
CREAT SERPL-MCNC: 1.2 MG/DL (ref 0.4–1.2)
EOSINOPHILS ABSOLUTE: 0.2 K/UL (ref 0–0.4)
EOSINOPHILS RELATIVE PERCENT: 2.2 %
FERRITIN: 82 NG/ML (ref 22–322)
GFR AFRICAN AMERICAN: 53
GFR NON-AFRICAN AMERICAN: 44
GLOBULIN: 2.9 G/DL
GLUCOSE BLD-MCNC: 91 MG/DL (ref 74–106)
HCT VFR BLD CALC: 29.4 % (ref 37–47)
HEMOGLOBIN: 8.5 G/DL (ref 11.5–16.5)
IMMATURE GRANULOCYTES #: 0 K/UL
IMMATURE GRANULOCYTES %: 0.4 % (ref 0–5)
IRON: 29 UG/DL (ref 37–145)
LYMPHOCYTES ABSOLUTE: 1.4 K/UL (ref 1.5–4)
LYMPHOCYTES RELATIVE PERCENT: 15.9 %
MCH RBC QN AUTO: 27.8 PG (ref 27–32)
MCHC RBC AUTO-ENTMCNC: 28.9 G/DL (ref 31–35)
MCV RBC AUTO: 96.1 FL (ref 80–100)
MONOCYTES ABSOLUTE: 0.8 K/UL (ref 0.2–0.8)
MONOCYTES RELATIVE PERCENT: 8.5 %
NEUTROPHILS ABSOLUTE: 6.5 K/UL (ref 2–7.5)
NEUTROPHILS RELATIVE PERCENT: 72.7 %
PDW BLD-RTO: 13.7 % (ref 11–16)
PLATELET # BLD: 292 K/UL (ref 150–400)
PMV BLD AUTO: 10 FL (ref 6–10)
POTASSIUM SERPL-SCNC: 4.3 MMOL/L (ref 3.4–5.1)
RBC # BLD: 3.06 M/UL (ref 3.8–5.8)
SODIUM BLD-SCNC: 145 MMOL/L (ref 136–145)
TOTAL PROTEIN: 6.1 G/DL (ref 6.4–8.3)
WBC # BLD: 9 K/UL (ref 4–11)

## 2018-08-01 PROCEDURE — G8399 PT W/DXA RESULTS DOCUMENT: HCPCS | Performed by: NURSE PRACTITIONER

## 2018-08-01 PROCEDURE — 85025 COMPLETE CBC W/AUTO DIFF WBC: CPT

## 2018-08-01 PROCEDURE — G8926 SPIRO NO PERF OR DOC: HCPCS | Performed by: NURSE PRACTITIONER

## 2018-08-01 PROCEDURE — G8427 DOCREV CUR MEDS BY ELIG CLIN: HCPCS | Performed by: NURSE PRACTITIONER

## 2018-08-01 PROCEDURE — 1090F PRES/ABSN URINE INCON ASSESS: CPT | Performed by: NURSE PRACTITIONER

## 2018-08-01 PROCEDURE — 3023F SPIROM DOC REV: CPT | Performed by: NURSE PRACTITIONER

## 2018-08-01 PROCEDURE — 1036F TOBACCO NON-USER: CPT | Performed by: NURSE PRACTITIONER

## 2018-08-01 PROCEDURE — 1101F PT FALLS ASSESS-DOCD LE1/YR: CPT | Performed by: NURSE PRACTITIONER

## 2018-08-01 PROCEDURE — 83540 ASSAY OF IRON: CPT

## 2018-08-01 PROCEDURE — 4040F PNEUMOC VAC/ADMIN/RCVD: CPT | Performed by: NURSE PRACTITIONER

## 2018-08-01 PROCEDURE — 36415 COLL VENOUS BLD VENIPUNCTURE: CPT

## 2018-08-01 PROCEDURE — G8420 CALC BMI NORM PARAMETERS: HCPCS | Performed by: NURSE PRACTITIONER

## 2018-08-01 PROCEDURE — 99213 OFFICE O/P EST LOW 20 MIN: CPT | Performed by: NURSE PRACTITIONER

## 2018-08-01 PROCEDURE — 80053 COMPREHEN METABOLIC PANEL: CPT

## 2018-08-01 PROCEDURE — 1123F ACP DISCUSS/DSCN MKR DOCD: CPT | Performed by: NURSE PRACTITIONER

## 2018-08-01 PROCEDURE — 82728 ASSAY OF FERRITIN: CPT

## 2018-08-01 ASSESSMENT — ENCOUNTER SYMPTOMS
SORE THROAT: 0
NAUSEA: 0
EYE PAIN: 0
COUGH: 0
VOMITING: 0
SHORTNESS OF BREATH: 0
ABDOMINAL PAIN: 0

## 2018-08-01 NOTE — PROGRESS NOTES
Have you seen any other physician or provider since your last visit? yes - General surgeon-    Have you had any other diagnostic tests since your last visit? no    Have you changed or stopped any medications since your last visit including any over-the-counter medicines, vitamins, or herbal medicines? no     Are you taking all your prescribed medications? Yes  If NO, why? -  N/A      REVIEW OF SYSTEMS:  Review of Systems   Constitutional: Negative for chills and fever. HENT: Negative for ear pain and sore throat. Eyes: Negative for pain and visual disturbance. Respiratory: Negative for cough and shortness of breath. Cardiovascular: Negative for chest pain, palpitations and leg swelling. Gastrointestinal: Negative for abdominal pain, nausea and vomiting. Genitourinary: Negative for dysuria and hematuria. Musculoskeletal: Positive for arthralgias. Negative for joint swelling. Severe pain in the hands   Skin: Negative for rash. Less erythema to the finger but the skin beneath the nail is thickened   Neurological: Negative for dizziness and weakness. Psychiatric/Behavioral: Negative for sleep disturbance.

## 2018-08-03 DIAGNOSIS — D64.9 ANEMIA, UNSPECIFIED TYPE: Primary | ICD-10-CM

## 2018-08-03 RX ORDER — LANOLIN ALCOHOL/MO/W.PET/CERES
325 CREAM (GRAM) TOPICAL 2 TIMES DAILY
Qty: 60 TABLET | Refills: 5 | Status: SHIPPED | OUTPATIENT
Start: 2018-08-03 | End: 2018-11-01 | Stop reason: SDUPTHER

## 2018-08-20 ENCOUNTER — NURSE ONLY (OUTPATIENT)
Dept: PRIMARY CARE CLINIC | Age: 78
End: 2018-08-20
Payer: MEDICARE

## 2018-08-20 DIAGNOSIS — Z12.11 COLON CANCER SCREENING: Primary | ICD-10-CM

## 2018-08-20 LAB
CONTROL: NORMAL
HEMOCCULT STL QL: NORMAL

## 2018-08-20 PROCEDURE — 82274 ASSAY TEST FOR BLOOD FECAL: CPT | Performed by: NURSE PRACTITIONER

## 2018-08-21 NOTE — PROGRESS NOTES
SUBJECTIVE:    Patient ID: Geovani Carpenter is a 69 y.o. female. Medical history Review  Past Medical, Family, and Social History reviewed and does not contribute to the patient presenting condition    Health Maintenance Due   Topic Date Due    Shingles Vaccine (1 of 2 - 2 Dose Series) 11/22/1991    Low dose CT lung screening  05/12/2012        HPI:   Chief Complaint   Patient presents with    Arthritis     Patient here today for a follow up. She states her arthritis is bothering her. She seen her surgeon they think she has a hernia. They are waiting on a Ct Scan. She has been using her nebs twice a day. Her granddaughter states she hasn't been using the Anoro since she has lived with her. She is wondering if thats why her breathing has been bad. She is taking 2-3mg of Prednisone daily but stopped the Methotrexate on her own. Patient's medications, allergies, past medical, surgical, social and family histories were reviewed and updated as appropriate. Review of Systems Reviewed and acurate. See MA note. OBJECTIVE:  /60 (Site: Right Arm, Position: Sitting, Cuff Size: Medium Adult)   Pulse 115   Wt 111 lb (50.3 kg)   SpO2 94%   BMI 20.30 kg/m²    Physical Exam   Constitutional: She is oriented to person, place, and time. She appears well-developed and well-nourished. No distress. HENT:   Head: Normocephalic. Right Ear: Tympanic membrane normal.   Left Ear: Tympanic membrane normal.   Mouth/Throat: No oropharyngeal exudate. Eyes: Lids are normal.   Neck: Neck supple. Cardiovascular: Normal rate, regular rhythm and normal heart sounds. Pulmonary/Chest: Effort normal. She has decreased breath sounds. Abdominal: Soft. Bowel sounds are normal. She exhibits no distension. There is no tenderness. Musculoskeletal: She exhibits no edema. Lymphadenopathy:     She has no cervical adenopathy. Neurological: She is alert and oriented to person, place, and time.    Skin: Skin is warm and dry. Psychiatric: Her mood appears anxious. Cognition and memory are impaired. Slight agitation   Vitals reviewed. Results in Past 30 Days  Result Component Current Result Ref Range Previous Result Ref Range   Alb 3.2 (L) (8/1/2018) 3.4 - 4.8 g/dL Not in Time Range    Albumin/Globulin Ratio 1.1 (8/1/2018) 0.8 - 2.0 Not in Time Range    Alkaline Phosphatase 48 (8/1/2018) 25 - 100 U/L Not in Time Range    ALT 6 (8/1/2018) 4 - 36 U/L Not in Time Range    AST 13 (8/1/2018) 8 - 33 U/L Not in Time Range    BUN 23 (H) (8/1/2018) 6 - 20 mg/dL Not in Time Range    Calcium 9.0 (8/1/2018) 8.5 - 10.5 mg/dL Not in Time Range    Chloride 106 (8/1/2018) 98 - 107 mmol/L Not in Time Range    CO2 25 (8/1/2018) 20 - 30 mmol/L Not in Time Range    CREATININE 1.2 (8/1/2018) 0.4 - 1.2 mg/dL Not in Time Range    GFR  53 (L) (8/1/2018) >59 Not in Time Range    GFR Non- 44 (L) (8/1/2018) >59 Not in Time Range    Globulin 2.9 (8/1/2018) g/dL Not in Time Range    Glucose 91 (8/1/2018) 74 - 106 mg/dL Not in Time Range    Potassium 4.3 (8/1/2018) 3.4 - 5.1 mmol/L Not in Time Range    Sodium 145 (8/1/2018) 136 - 145 mmol/L Not in Time Range    Total Bilirubin <0.2 (L) (8/1/2018) 0.3 - 1.2 mg/dL Not in Time Range    Total Protein 6.1 (L) (8/1/2018) 6.4 - 8.3 g/dL Not in Time Range        Hemoglobin A1C (%)   Date Value   01/25/2018 4.6     Microscopic Examination (no units)   Date Value   01/01/2017 Not Indicated     LDL Calculated (mg/dL)   Date Value   06/01/2018 48         Lab Results   Component Value Date    WBC 9.0 08/01/2018    NEUTROABS 6.5 08/01/2018    HGB 8.5 08/01/2018    HCT 29.4 08/01/2018    MCV 96.1 08/01/2018     08/01/2018       Lab Results   Component Value Date    TSH 2.43 01/01/2017       Prior to Visit Medications    Medication Sig Taking?  Authorizing Provider   ipratropium-albuterol (DUONEB) 0.5-2.5 (3) MG/3ML SOLN nebulizer solution INHALE CONTENTS OF 1 VIAL EVERY 6

## 2018-08-28 ENCOUNTER — TELEPHONE (OUTPATIENT)
Dept: PRIMARY CARE CLINIC | Age: 78
End: 2018-08-28

## 2018-10-03 ENCOUNTER — OFFICE VISIT (OUTPATIENT)
Dept: PRIMARY CARE CLINIC | Age: 78
End: 2018-10-03
Payer: MEDICARE

## 2018-10-03 ENCOUNTER — TELEPHONE (OUTPATIENT)
Dept: PRIMARY CARE CLINIC | Age: 78
End: 2018-10-03

## 2018-10-03 ENCOUNTER — HOSPITAL ENCOUNTER (OUTPATIENT)
Facility: HOSPITAL | Age: 78
Discharge: HOME | End: 2018-10-03
Payer: MEDICARE

## 2018-10-03 VITALS
HEIGHT: 62 IN | BODY MASS INDEX: 18.95 KG/M2 | SYSTOLIC BLOOD PRESSURE: 110 MMHG | OXYGEN SATURATION: 90 % | DIASTOLIC BLOOD PRESSURE: 70 MMHG | WEIGHT: 103 LBS | HEART RATE: 84 BPM

## 2018-10-03 DIAGNOSIS — J44.9 CHRONIC OBSTRUCTIVE PULMONARY DISEASE, UNSPECIFIED COPD TYPE (HCC): ICD-10-CM

## 2018-10-03 DIAGNOSIS — J02.9 ACUTE PHARYNGITIS, UNSPECIFIED ETIOLOGY: ICD-10-CM

## 2018-10-03 DIAGNOSIS — F09 COGNITIVE DYSFUNCTION: ICD-10-CM

## 2018-10-03 DIAGNOSIS — E78.5 HYPERLIPIDEMIA, UNSPECIFIED HYPERLIPIDEMIA TYPE: ICD-10-CM

## 2018-10-03 DIAGNOSIS — I10 ESSENTIAL HYPERTENSION: ICD-10-CM

## 2018-10-03 DIAGNOSIS — Z00.00 ROUTINE GENERAL MEDICAL EXAMINATION AT A HEALTH CARE FACILITY: ICD-10-CM

## 2018-10-03 DIAGNOSIS — D64.9 ANEMIA, UNSPECIFIED TYPE: ICD-10-CM

## 2018-10-03 DIAGNOSIS — R63.4 WEIGHT LOSS: ICD-10-CM

## 2018-10-03 DIAGNOSIS — J20.9 ACUTE BRONCHITIS, UNSPECIFIED ORGANISM: ICD-10-CM

## 2018-10-03 DIAGNOSIS — Z00.00 ENCOUNTER FOR MEDICARE ANNUAL WELLNESS EXAM: Primary | ICD-10-CM

## 2018-10-03 LAB
A/G RATIO: 1.2 (ref 0.8–2)
ALBUMIN SERPL-MCNC: 3.8 G/DL (ref 3.4–4.8)
ALP BLD-CCNC: 61 U/L (ref 25–100)
ALT SERPL-CCNC: 8 U/L (ref 4–36)
ANION GAP SERPL CALCULATED.3IONS-SCNC: 13 MMOL/L (ref 3–16)
AST SERPL-CCNC: 15 U/L (ref 8–33)
BASOPHILS ABSOLUTE: 0 K/UL (ref 0–0.1)
BASOPHILS RELATIVE PERCENT: 0.3 %
BILIRUB SERPL-MCNC: 0.4 MG/DL (ref 0.3–1.2)
BUN BLDV-MCNC: 18 MG/DL (ref 6–20)
CALCIUM SERPL-MCNC: 9.6 MG/DL (ref 8.5–10.5)
CHLORIDE BLD-SCNC: 101 MMOL/L (ref 98–107)
CHOLESTEROL, TOTAL: 159 MG/DL (ref 0–200)
CO2: 27 MMOL/L (ref 20–30)
CREAT SERPL-MCNC: 1.4 MG/DL (ref 0.4–1.2)
EOSINOPHILS ABSOLUTE: 0.2 K/UL (ref 0–0.4)
EOSINOPHILS RELATIVE PERCENT: 2.1 %
FERRITIN: 96.4 NG/ML (ref 22–322)
FOLATE: 5.37 NG/ML
GFR AFRICAN AMERICAN: 44
GFR NON-AFRICAN AMERICAN: 36
GLOBULIN: 3.1 G/DL
GLUCOSE BLD-MCNC: 95 MG/DL (ref 74–106)
HCT VFR BLD CALC: 32.7 % (ref 37–47)
HDLC SERPL-MCNC: 61 MG/DL (ref 40–60)
HEMOGLOBIN: 9.8 G/DL (ref 11.5–16.5)
IMMATURE GRANULOCYTES #: 0 K/UL
IMMATURE GRANULOCYTES %: 0.3 % (ref 0–5)
IRON: 38 UG/DL (ref 37–145)
LDL CHOLESTEROL CALCULATED: 73 MG/DL
LYMPHOCYTES ABSOLUTE: 1.2 K/UL (ref 1.5–4)
LYMPHOCYTES RELATIVE PERCENT: 15.8 %
MCH RBC QN AUTO: 28.2 PG (ref 27–32)
MCHC RBC AUTO-ENTMCNC: 30 G/DL (ref 31–35)
MCV RBC AUTO: 94 FL (ref 80–100)
MONOCYTES ABSOLUTE: 0.7 K/UL (ref 0.2–0.8)
MONOCYTES RELATIVE PERCENT: 9.1 %
NEUTROPHILS ABSOLUTE: 5.3 K/UL (ref 2–7.5)
NEUTROPHILS RELATIVE PERCENT: 72.4 %
PDW BLD-RTO: 12.9 % (ref 11–16)
PLATELET # BLD: 266 K/UL (ref 150–400)
PMV BLD AUTO: 9.7 FL (ref 6–10)
POTASSIUM SERPL-SCNC: 3.5 MMOL/L (ref 3.4–5.1)
RBC # BLD: 3.48 M/UL (ref 3.8–5.8)
SODIUM BLD-SCNC: 141 MMOL/L (ref 136–145)
TOTAL IRON BINDING CAPACITY: 226 UG/DL (ref 250–450)
TOTAL PROTEIN: 6.9 G/DL (ref 6.4–8.3)
TRIGL SERPL-MCNC: 124 MG/DL (ref 0–249)
VITAMIN B-12: 672 PG/ML (ref 211–911)
VLDLC SERPL CALC-MCNC: 25 MG/DL
WBC # BLD: 7.3 K/UL (ref 4–11)

## 2018-10-03 PROCEDURE — 4040F PNEUMOC VAC/ADMIN/RCVD: CPT | Performed by: NURSE PRACTITIONER

## 2018-10-03 PROCEDURE — G0008 ADMIN INFLUENZA VIRUS VAC: HCPCS | Performed by: NURSE PRACTITIONER

## 2018-10-03 PROCEDURE — 82607 VITAMIN B-12: CPT

## 2018-10-03 PROCEDURE — 82746 ASSAY OF FOLIC ACID SERUM: CPT

## 2018-10-03 PROCEDURE — 80053 COMPREHEN METABOLIC PANEL: CPT

## 2018-10-03 PROCEDURE — G8482 FLU IMMUNIZE ORDER/ADMIN: HCPCS | Performed by: NURSE PRACTITIONER

## 2018-10-03 PROCEDURE — 85025 COMPLETE CBC W/AUTO DIFF WBC: CPT

## 2018-10-03 PROCEDURE — 83540 ASSAY OF IRON: CPT

## 2018-10-03 PROCEDURE — 36415 COLL VENOUS BLD VENIPUNCTURE: CPT

## 2018-10-03 PROCEDURE — 90688 IIV4 VACCINE SPLT 0.5 ML IM: CPT | Performed by: NURSE PRACTITIONER

## 2018-10-03 PROCEDURE — 82728 ASSAY OF FERRITIN: CPT

## 2018-10-03 PROCEDURE — G0438 PPPS, INITIAL VISIT: HCPCS | Performed by: NURSE PRACTITIONER

## 2018-10-03 PROCEDURE — 83550 IRON BINDING TEST: CPT

## 2018-10-03 PROCEDURE — 80061 LIPID PANEL: CPT

## 2018-10-03 RX ORDER — AZITHROMYCIN 250 MG/1
TABLET, FILM COATED ORAL
Qty: 6 TABLET | Refills: 0 | Status: SHIPPED | OUTPATIENT
Start: 2018-10-03 | End: 2018-10-13

## 2018-10-03 RX ORDER — ONDANSETRON 4 MG/1
4 TABLET, ORALLY DISINTEGRATING ORAL EVERY 8 HOURS PRN
Qty: 30 TABLET | Refills: 5 | Status: SHIPPED | OUTPATIENT
Start: 2018-10-03

## 2018-10-03 ASSESSMENT — ENCOUNTER SYMPTOMS
NAUSEA: 1
COUGH: 0
ABDOMINAL PAIN: 0
SORE THROAT: 0
SHORTNESS OF BREATH: 0
EYE PAIN: 0
VOMITING: 0

## 2018-10-03 ASSESSMENT — ANXIETY QUESTIONNAIRES: GAD7 TOTAL SCORE: 0

## 2018-10-03 ASSESSMENT — PATIENT HEALTH QUESTIONNAIRE - PHQ9
SUM OF ALL RESPONSES TO PHQ QUESTIONS 1-9: 0
SUM OF ALL RESPONSES TO PHQ QUESTIONS 1-9: 0

## 2018-10-03 ASSESSMENT — LIFESTYLE VARIABLES: HOW OFTEN DO YOU HAVE A DRINK CONTAINING ALCOHOL: 0

## 2018-10-03 NOTE — TELEPHONE ENCOUNTER
----- Message from JOYCE Echols sent at 10/3/2018  1:54 PM EDT -----  Folic acid is a little low, be sure to take the folic acid pills. Other labs are OK. Blood count has come up. Kidney test is slightly elevated. We will watch this. Cholesterol is great.

## 2018-10-05 ENCOUNTER — TRANSCRIBE ORDERS (OUTPATIENT)
Dept: ADMINISTRATIVE | Facility: HOSPITAL | Age: 78
End: 2018-10-05

## 2018-10-25 RX ORDER — ISOSORBIDE MONONITRATE 30 MG/1
TABLET, EXTENDED RELEASE ORAL
Qty: 30 TABLET | Refills: 5 | Status: SHIPPED | OUTPATIENT
Start: 2018-10-25 | End: 2019-04-22 | Stop reason: SDUPTHER

## 2018-10-25 RX ORDER — RANITIDINE 300 MG/1
TABLET ORAL
Qty: 60 TABLET | Refills: 5 | Status: SHIPPED | OUTPATIENT
Start: 2018-10-25 | End: 2019-04-22 | Stop reason: SDUPTHER

## 2018-10-25 RX ORDER — DOXEPIN HYDROCHLORIDE 50 MG/1
CAPSULE ORAL
Qty: 30 CAPSULE | Refills: 5 | Status: SHIPPED | OUTPATIENT
Start: 2018-10-25 | End: 2019-04-29 | Stop reason: SDUPTHER

## 2018-10-25 RX ORDER — TRIAMTERENE AND HYDROCHLOROTHIAZIDE 37.5; 25 MG/1; MG/1
TABLET ORAL
Qty: 30 TABLET | Refills: 5 | Status: SHIPPED | OUTPATIENT
Start: 2018-10-25 | End: 2019-04-29 | Stop reason: SDUPTHER

## 2018-10-25 RX ORDER — ATORVASTATIN CALCIUM 80 MG/1
TABLET, FILM COATED ORAL
Qty: 30 TABLET | Refills: 5 | Status: SHIPPED | OUTPATIENT
Start: 2018-10-25 | End: 2019-04-22 | Stop reason: SDUPTHER

## 2018-11-01 ENCOUNTER — TELEPHONE (OUTPATIENT)
Dept: PRIMARY CARE CLINIC | Age: 78
End: 2018-11-01

## 2018-11-01 DIAGNOSIS — Z00.00 ENCOUNTER FOR MEDICARE ANNUAL WELLNESS EXAM: ICD-10-CM

## 2018-11-01 RX ORDER — LANOLIN ALCOHOL/MO/W.PET/CERES
325 CREAM (GRAM) TOPICAL 2 TIMES DAILY
Qty: 60 TABLET | Refills: 5 | Status: SHIPPED | OUTPATIENT
Start: 2018-11-01 | End: 2019-05-03 | Stop reason: SDUPTHER

## 2018-11-26 RX ORDER — PREDNISONE 1 MG/1
3 TABLET ORAL DAILY
Qty: 90 TABLET | Refills: 5 | Status: SHIPPED | OUTPATIENT
Start: 2018-11-26 | End: 2019-01-25 | Stop reason: SDUPTHER

## 2018-12-12 DIAGNOSIS — M06.9 RHEUMATOID ARTHRITIS, INVOLVING UNSPECIFIED SITE, UNSPECIFIED RHEUMATOID FACTOR PRESENCE: ICD-10-CM

## 2018-12-12 RX ORDER — TRAMADOL HYDROCHLORIDE 50 MG/1
50 TABLET ORAL EVERY 6 HOURS PRN
Qty: 60 TABLET | Refills: 2 | Status: SHIPPED | OUTPATIENT
Start: 2018-12-12 | End: 2019-01-29 | Stop reason: SDUPTHER

## 2019-01-25 ENCOUNTER — OFFICE VISIT (OUTPATIENT)
Dept: PRIMARY CARE CLINIC | Age: 79
End: 2019-01-25
Payer: MEDICARE

## 2019-01-25 ENCOUNTER — HOSPITAL ENCOUNTER (OUTPATIENT)
Facility: HOSPITAL | Age: 79
Discharge: HOME | End: 2019-01-25
Payer: MEDICARE

## 2019-01-25 VITALS
OXYGEN SATURATION: 93 % | DIASTOLIC BLOOD PRESSURE: 70 MMHG | HEART RATE: 93 BPM | WEIGHT: 102 LBS | BODY MASS INDEX: 18.66 KG/M2 | SYSTOLIC BLOOD PRESSURE: 128 MMHG

## 2019-01-25 DIAGNOSIS — D64.9 ANEMIA, UNSPECIFIED TYPE: ICD-10-CM

## 2019-01-25 DIAGNOSIS — I10 ESSENTIAL HYPERTENSION: ICD-10-CM

## 2019-01-25 DIAGNOSIS — R73.9 HYPERGLYCEMIA: ICD-10-CM

## 2019-01-25 DIAGNOSIS — M06.9 RHEUMATOID ARTHRITIS, INVOLVING UNSPECIFIED SITE, UNSPECIFIED RHEUMATOID FACTOR PRESENCE: Primary | ICD-10-CM

## 2019-01-25 DIAGNOSIS — J44.9 CHRONIC OBSTRUCTIVE PULMONARY DISEASE, UNSPECIFIED COPD TYPE (HCC): ICD-10-CM

## 2019-01-25 DIAGNOSIS — L08.9 FINGER INFECTION: ICD-10-CM

## 2019-01-25 LAB
A/G RATIO: 1.2 (ref 0.8–2)
ALBUMIN SERPL-MCNC: 3.5 G/DL (ref 3.4–4.8)
ALP BLD-CCNC: 71 U/L (ref 25–100)
ALT SERPL-CCNC: 7 U/L (ref 4–36)
ANION GAP SERPL CALCULATED.3IONS-SCNC: 12 MMOL/L (ref 3–16)
AST SERPL-CCNC: 13 U/L (ref 8–33)
BASOPHILS ABSOLUTE: 0 K/UL (ref 0–0.1)
BASOPHILS RELATIVE PERCENT: 0.4 %
BILIRUB SERPL-MCNC: 0.3 MG/DL (ref 0.3–1.2)
BUN BLDV-MCNC: 21 MG/DL (ref 6–20)
CALCIUM SERPL-MCNC: 9.2 MG/DL (ref 8.5–10.5)
CHLORIDE BLD-SCNC: 102 MMOL/L (ref 98–107)
CO2: 26 MMOL/L (ref 20–30)
CREAT SERPL-MCNC: 1.5 MG/DL (ref 0.4–1.2)
EOSINOPHILS ABSOLUTE: 0.1 K/UL (ref 0–0.4)
EOSINOPHILS RELATIVE PERCENT: 0.7 %
FERRITIN: 115.7 NG/ML (ref 22–322)
FOLATE: 9.75 NG/ML
GFR AFRICAN AMERICAN: 41
GFR NON-AFRICAN AMERICAN: 34
GLOBULIN: 2.9 G/DL
GLUCOSE BLD-MCNC: 105 MG/DL (ref 74–106)
HBA1C MFR BLD: 5.5 %
HCT VFR BLD CALC: 31.8 % (ref 37–47)
HEMOGLOBIN: 9.3 G/DL (ref 11.5–16.5)
IMMATURE GRANULOCYTES #: 0 K/UL
IMMATURE GRANULOCYTES %: 0.3 % (ref 0–5)
IRON: 65 UG/DL (ref 37–145)
LYMPHOCYTES ABSOLUTE: 1.3 K/UL (ref 1.5–4)
LYMPHOCYTES RELATIVE PERCENT: 18.4 %
MCH RBC QN AUTO: 28.2 PG (ref 27–32)
MCHC RBC AUTO-ENTMCNC: 29.2 G/DL (ref 31–35)
MCV RBC AUTO: 96.4 FL (ref 80–100)
MONOCYTES ABSOLUTE: 0.4 K/UL (ref 0.2–0.8)
MONOCYTES RELATIVE PERCENT: 5.2 %
NEUTROPHILS ABSOLUTE: 5.2 K/UL (ref 2–7.5)
NEUTROPHILS RELATIVE PERCENT: 75 %
PDW BLD-RTO: 13.3 % (ref 11–16)
PLATELET # BLD: 332 K/UL (ref 150–400)
PMV BLD AUTO: 10.1 FL (ref 6–10)
POTASSIUM SERPL-SCNC: 4.5 MMOL/L (ref 3.4–5.1)
RBC # BLD: 3.3 M/UL (ref 3.8–5.8)
SODIUM BLD-SCNC: 140 MMOL/L (ref 136–145)
TOTAL IRON BINDING CAPACITY: 208 UG/DL (ref 250–450)
TOTAL PROTEIN: 6.4 G/DL (ref 6.4–8.3)
WBC # BLD: 6.9 K/UL (ref 4–11)

## 2019-01-25 PROCEDURE — 83550 IRON BINDING TEST: CPT

## 2019-01-25 PROCEDURE — 83036 HEMOGLOBIN GLYCOSYLATED A1C: CPT

## 2019-01-25 PROCEDURE — 1123F ACP DISCUSS/DSCN MKR DOCD: CPT | Performed by: NURSE PRACTITIONER

## 2019-01-25 PROCEDURE — 82728 ASSAY OF FERRITIN: CPT

## 2019-01-25 PROCEDURE — G8420 CALC BMI NORM PARAMETERS: HCPCS | Performed by: NURSE PRACTITIONER

## 2019-01-25 PROCEDURE — 83540 ASSAY OF IRON: CPT

## 2019-01-25 PROCEDURE — 82746 ASSAY OF FOLIC ACID SERUM: CPT

## 2019-01-25 PROCEDURE — 85025 COMPLETE CBC W/AUTO DIFF WBC: CPT

## 2019-01-25 PROCEDURE — 1090F PRES/ABSN URINE INCON ASSESS: CPT | Performed by: NURSE PRACTITIONER

## 2019-01-25 PROCEDURE — G8427 DOCREV CUR MEDS BY ELIG CLIN: HCPCS | Performed by: NURSE PRACTITIONER

## 2019-01-25 PROCEDURE — 36415 COLL VENOUS BLD VENIPUNCTURE: CPT

## 2019-01-25 PROCEDURE — G8399 PT W/DXA RESULTS DOCUMENT: HCPCS | Performed by: NURSE PRACTITIONER

## 2019-01-25 PROCEDURE — 80053 COMPREHEN METABOLIC PANEL: CPT

## 2019-01-25 PROCEDURE — G8482 FLU IMMUNIZE ORDER/ADMIN: HCPCS | Performed by: NURSE PRACTITIONER

## 2019-01-25 PROCEDURE — 1036F TOBACCO NON-USER: CPT | Performed by: NURSE PRACTITIONER

## 2019-01-25 PROCEDURE — G8926 SPIRO NO PERF OR DOC: HCPCS | Performed by: NURSE PRACTITIONER

## 2019-01-25 PROCEDURE — 3023F SPIROM DOC REV: CPT | Performed by: NURSE PRACTITIONER

## 2019-01-25 PROCEDURE — 99213 OFFICE O/P EST LOW 20 MIN: CPT | Performed by: NURSE PRACTITIONER

## 2019-01-25 PROCEDURE — 1101F PT FALLS ASSESS-DOCD LE1/YR: CPT | Performed by: NURSE PRACTITIONER

## 2019-01-25 PROCEDURE — 4040F PNEUMOC VAC/ADMIN/RCVD: CPT | Performed by: NURSE PRACTITIONER

## 2019-01-25 RX ORDER — PREDNISONE 1 MG/1
5 TABLET ORAL DAILY
Qty: 30 TABLET | Refills: 5 | Status: SHIPPED | OUTPATIENT
Start: 2019-01-25

## 2019-01-25 ASSESSMENT — ENCOUNTER SYMPTOMS
SHORTNESS OF BREATH: 1
EYE PAIN: 0
NAUSEA: 0
VOMITING: 0
SORE THROAT: 0
ABDOMINAL PAIN: 0
COUGH: 0

## 2019-01-29 DIAGNOSIS — M06.9 RHEUMATOID ARTHRITIS, INVOLVING UNSPECIFIED SITE, UNSPECIFIED RHEUMATOID FACTOR PRESENCE: ICD-10-CM

## 2019-01-29 RX ORDER — TRAMADOL HYDROCHLORIDE 50 MG/1
50 TABLET ORAL EVERY 6 HOURS PRN
Qty: 60 TABLET | Refills: 2 | Status: SHIPPED | OUTPATIENT
Start: 2019-01-29 | End: 2019-02-28

## 2019-02-03 ENCOUNTER — HOSPITAL ENCOUNTER (EMERGENCY)
Facility: HOSPITAL | Age: 79
Discharge: HOME | End: 2019-02-03
Attending: EMERGENCY MEDICINE | Admitting: EMERGENCY MEDICINE

## 2019-02-03 ENCOUNTER — APPOINTMENT (OUTPATIENT)
Dept: GENERAL RADIOLOGY | Facility: HOSPITAL | Age: 79
End: 2019-02-03

## 2019-02-03 VITALS
HEART RATE: 94 BPM | HEIGHT: 62 IN | DIASTOLIC BLOOD PRESSURE: 70 MMHG | TEMPERATURE: 99.5 F | OXYGEN SATURATION: 96 % | RESPIRATION RATE: 30 BRPM | WEIGHT: 100 LBS | BODY MASS INDEX: 18.4 KG/M2 | SYSTOLIC BLOOD PRESSURE: 106 MMHG

## 2019-02-03 DIAGNOSIS — J04.0 LARYNGITIS: ICD-10-CM

## 2019-02-03 DIAGNOSIS — J11.1 INFLUENZA: Primary | ICD-10-CM

## 2019-02-03 LAB
ALBUMIN SERPL-MCNC: 3.89 G/DL (ref 3.2–4.8)
ALBUMIN/GLOB SERPL: 1.4 G/DL (ref 1.5–2.5)
ALP SERPL-CCNC: 73 U/L (ref 25–100)
ALT SERPL W P-5'-P-CCNC: 12 U/L (ref 7–40)
ANION GAP SERPL CALCULATED.3IONS-SCNC: 11 MMOL/L (ref 3–11)
AST SERPL-CCNC: 18 U/L (ref 0–33)
BASOPHILS # BLD AUTO: 0.01 10*3/MM3 (ref 0–0.2)
BASOPHILS NFR BLD AUTO: 0.2 % (ref 0–1)
BILIRUB SERPL-MCNC: 0.4 MG/DL (ref 0.3–1.2)
BNP SERPL-MCNC: 72 PG/ML (ref 0–100)
BUN BLD-MCNC: 22 MG/DL (ref 9–23)
BUN/CREAT SERPL: 13.8 (ref 7–25)
CALCIUM SPEC-SCNC: 8.9 MG/DL (ref 8.7–10.4)
CHLORIDE SERPL-SCNC: 104 MMOL/L (ref 99–109)
CO2 SERPL-SCNC: 23 MMOL/L (ref 20–31)
CREAT BLD-MCNC: 1.59 MG/DL (ref 0.6–1.3)
DEPRECATED RDW RBC AUTO: 46.4 FL (ref 37–54)
EOSINOPHIL # BLD AUTO: 0 10*3/MM3 (ref 0–0.3)
EOSINOPHIL NFR BLD AUTO: 0 % (ref 0–3)
ERYTHROCYTE [DISTWIDTH] IN BLOOD BY AUTOMATED COUNT: 13.5 % (ref 11.3–14.5)
GFR SERPL CREATININE-BSD FRML MDRD: 31 ML/MIN/1.73
GLOBULIN UR ELPH-MCNC: 2.8 GM/DL
GLUCOSE BLD-MCNC: 132 MG/DL (ref 70–100)
HCT VFR BLD AUTO: 30.9 % (ref 34.5–44)
HGB BLD-MCNC: 9.5 G/DL (ref 11.5–15.5)
HOLD SPECIMEN: NORMAL
HOLD SPECIMEN: NORMAL
IMM GRANULOCYTES # BLD AUTO: 0.01 10*3/MM3 (ref 0–0.03)
IMM GRANULOCYTES NFR BLD AUTO: 0.2 % (ref 0–0.6)
LYMPHOCYTES # BLD AUTO: 0.36 10*3/MM3 (ref 0.6–4.8)
LYMPHOCYTES NFR BLD AUTO: 7.4 % (ref 24–44)
MCH RBC QN AUTO: 28.7 PG (ref 27–31)
MCHC RBC AUTO-ENTMCNC: 30.7 G/DL (ref 32–36)
MCV RBC AUTO: 93.4 FL (ref 80–99)
MONOCYTES # BLD AUTO: 0.39 10*3/MM3 (ref 0–1)
MONOCYTES NFR BLD AUTO: 8 % (ref 0–12)
NEUTROPHILS # BLD AUTO: 4.12 10*3/MM3 (ref 1.5–8.3)
NEUTROPHILS NFR BLD AUTO: 84.2 % (ref 41–71)
PLATELET # BLD AUTO: 247 10*3/MM3 (ref 150–450)
PMV BLD AUTO: 9.4 FL (ref 6–12)
POTASSIUM BLD-SCNC: 3.7 MMOL/L (ref 3.5–5.5)
PROT SERPL-MCNC: 6.7 G/DL (ref 5.7–8.2)
RBC # BLD AUTO: 3.31 10*6/MM3 (ref 3.89–5.14)
SODIUM BLD-SCNC: 138 MMOL/L (ref 132–146)
TROPONIN I SERPL-MCNC: 0.01 NG/ML
WBC NRBC COR # BLD: 4.89 10*3/MM3 (ref 3.5–10.8)
WHOLE BLOOD HOLD SPECIMEN: NORMAL
WHOLE BLOOD HOLD SPECIMEN: NORMAL

## 2019-02-03 PROCEDURE — 83880 ASSAY OF NATRIURETIC PEPTIDE: CPT | Performed by: EMERGENCY MEDICINE

## 2019-02-03 PROCEDURE — 93005 ELECTROCARDIOGRAM TRACING: CPT | Performed by: EMERGENCY MEDICINE

## 2019-02-03 PROCEDURE — 71045 X-RAY EXAM CHEST 1 VIEW: CPT

## 2019-02-03 PROCEDURE — 84484 ASSAY OF TROPONIN QUANT: CPT | Performed by: EMERGENCY MEDICINE

## 2019-02-03 PROCEDURE — 80053 COMPREHEN METABOLIC PANEL: CPT | Performed by: EMERGENCY MEDICINE

## 2019-02-03 PROCEDURE — 96374 THER/PROPH/DIAG INJ IV PUSH: CPT

## 2019-02-03 PROCEDURE — 99285 EMERGENCY DEPT VISIT HI MDM: CPT

## 2019-02-03 PROCEDURE — 25010000002 METHYLPREDNISOLONE PER 40 MG: Performed by: EMERGENCY MEDICINE

## 2019-02-03 PROCEDURE — 85025 COMPLETE CBC W/AUTO DIFF WBC: CPT | Performed by: EMERGENCY MEDICINE

## 2019-02-03 RX ORDER — OSELTAMIVIR PHOSPHATE 30 MG/1
30 CAPSULE ORAL EVERY 12 HOURS
Qty: 10 CAPSULE | Refills: 0 | Status: ON HOLD | OUTPATIENT
Start: 2019-02-03 | End: 2019-02-09

## 2019-02-03 RX ORDER — METHYLPREDNISOLONE SODIUM SUCCINATE 40 MG/ML
80 INJECTION, POWDER, LYOPHILIZED, FOR SOLUTION INTRAMUSCULAR; INTRAVENOUS ONCE
Status: COMPLETED | OUTPATIENT
Start: 2019-02-03 | End: 2019-02-03

## 2019-02-03 RX ORDER — IPRATROPIUM BROMIDE AND ALBUTEROL SULFATE 2.5; .5 MG/3ML; MG/3ML
3 SOLUTION RESPIRATORY (INHALATION) 2 TIMES DAILY PRN
Status: ON HOLD | COMMUNITY
End: 2019-02-08

## 2019-02-03 RX ORDER — SODIUM CHLORIDE 0.9 % (FLUSH) 0.9 %
10 SYRINGE (ML) INJECTION AS NEEDED
Status: DISCONTINUED | OUTPATIENT
Start: 2019-02-03 | End: 2019-02-03 | Stop reason: HOSPADM

## 2019-02-03 RX ORDER — FERROUS SULFATE 325(65) MG
325 TABLET ORAL 2 TIMES DAILY
COMMUNITY

## 2019-02-03 RX ORDER — PREDNISONE 20 MG/1
20 TABLET ORAL 2 TIMES DAILY
Qty: 8 TABLET | Refills: 0 | Status: ON HOLD | OUTPATIENT
Start: 2019-02-03 | End: 2019-02-09

## 2019-02-03 RX ADMIN — METHYLPREDNISOLONE SODIUM SUCCINATE 80 MG: 40 INJECTION, POWDER, FOR SOLUTION INTRAMUSCULAR; INTRAVENOUS at 14:15

## 2019-02-03 NOTE — DISCHARGE INSTRUCTIONS
Rest voice, drink plenty of fluids, as instructed in the laryngitis information.      Be sure to return to the ER if you have worsening symptoms, especially if you start having wheezing when you breathe or become short of breath.

## 2019-02-03 NOTE — ED PROVIDER NOTES
Subjective   Olga Shine is a 78 y.o. female who presents to the ED with complaints of shortness of breath. In the middle of the night last night, the patient reports that her oxygen came off and she has been short of breath since then. She states that she is feeling dizzy, nauseated, and has developed a headache associated with her shortness of breath. She denies congestion, cough, shakes, chills, fever, and leg swelling. The patient has had sick contacts with her great grandsons who have influenza. She is on 2 L of oxygen 24 hours a day. She takes 5 mg of prednisone for psoriatic arthritis. She is also on antacids. There are no other acute complaints at this time.         History provided by:  Patient and relative  Shortness of Breath   Severity:  Moderate  Onset quality:  Sudden  Duration:  1 day  Chronicity:  New  Relieved by:  Nothing  Worsened by:  Nothing  Ineffective treatments:  None tried  Associated symptoms: headaches    Associated symptoms: no cough, no fever and no vomiting        Review of Systems   Constitutional: Negative for chills and fever.   HENT: Negative for congestion.    Respiratory: Positive for shortness of breath. Negative for cough.    Cardiovascular: Negative for leg swelling.   Gastrointestinal: Positive for nausea. Negative for diarrhea and vomiting.   Musculoskeletal:        Neck swelling   Neurological: Positive for dizziness and headaches.   All other systems reviewed and are negative.      Past Medical History:   Diagnosis Date   • Anxiety    • Cardiac disorder    • COPD (chronic obstructive pulmonary disease) (CMS/[facility])    • Family history of cerebrovascular accident    • High cholesterol    • History of malignant neoplasm    • Hypertension    • Migraine        Allergies   Allergen Reactions   • Advair Diskus [Fluticasone-Salmeterol]    • Atenolol    • Ciprofloxacin    • Clindamycin/Lincomycin      choke   • Doxazosin    • Halobetasol    • Hydrocodone    • Penicillins    •  Percocet [Oxycodone-Acetaminophen]    • Sulfa Antibiotics    • Xanax [Alprazolam]        Past Surgical History:   Procedure Laterality Date   • CATARACT EXTRACTION      both eyes       Family History   Problem Relation Age of Onset   • Dementia Mother    • Stroke Sister    • Parkinsonism Sister    • Alcohol abuse Other         Uncle   • Alzheimer's disease Other         Uncle   • Stroke Father        Social History     Socioeconomic History   • Marital status:      Spouse name: Not on file   • Number of children: Not on file   • Years of education: Not on file   • Highest education level: Not on file   Tobacco Use   • Smoking status: Former Smoker     Types: Cigarettes     Last attempt to quit: 10/20/2014     Years since quittin.2   Substance and Sexual Activity   • Alcohol use: No   • Drug use: No   • Sexual activity: Defer         Objective   Physical Exam   Constitutional: She is oriented to person, place, and time. She appears well-developed and well-nourished. No distress.   Petite, elder female who is obviously hoarse.    HENT:   Head: Normocephalic and atraumatic.   Nose: Nose normal.   Oropharynx is patent.    Eyes: Conjunctivae are normal. No scleral icterus.   Neck: Normal range of motion. Neck supple.   Cardiovascular: Regular rhythm and normal heart sounds. Tachycardia present.   Pulmonary/Chest: She is in respiratory distress. She has decreased breath sounds. She has wheezes.   Decreased breath sounds. Increased respiratory effort. Diffuse wheezes. Moderate respiratory distress.    Abdominal: Soft. Bowel sounds are normal. There is no tenderness.   Musculoskeletal: Normal range of motion. She exhibits edema (Trace edema across bilateral lower extremities).   Lymphadenopathy:     She has no cervical adenopathy.   Neurological: She is alert and oriented to person, place, and time.   Skin: Skin is warm and dry.   Psychiatric: She has a normal mood and affect. Her behavior is normal.   Nursing  note and vitals reviewed.      Procedures         ED Course  ED Course as of Feb 03 1614   Sun Feb 03, 2019   1605 Treated clinically for influenza due to exposure and respiratory illness with fever (although low grade).  [LI]      ED Course User Index  [LI] Huy Fields MD       Recent Results (from the past 24 hour(s))   Comprehensive Metabolic Panel    Collection Time: 02/03/19  1:52 PM   Result Value Ref Range    Glucose 132 (H) 70 - 100 mg/dL    BUN 22 9 - 23 mg/dL    Creatinine 1.59 (H) 0.60 - 1.30 mg/dL    Sodium 138 132 - 146 mmol/L    Potassium 3.7 3.5 - 5.5 mmol/L    Chloride 104 99 - 109 mmol/L    CO2 23.0 20.0 - 31.0 mmol/L    Calcium 8.9 8.7 - 10.4 mg/dL    Total Protein 6.7 5.7 - 8.2 g/dL    Albumin 3.89 3.20 - 4.80 g/dL    ALT (SGPT) 12 7 - 40 U/L    AST (SGOT) 18 0 - 33 U/L    Alkaline Phosphatase 73 25 - 100 U/L    Total Bilirubin 0.4 0.3 - 1.2 mg/dL    eGFR Non African Amer 31 (L) >60 mL/min/1.73    Globulin 2.8 gm/dL    A/G Ratio 1.4 (L) 1.5 - 2.5 g/dL    BUN/Creatinine Ratio 13.8 7.0 - 25.0    Anion Gap 11.0 3.0 - 11.0 mmol/L   BNP    Collection Time: 02/03/19  1:52 PM   Result Value Ref Range    BNP 72.0 0.0 - 100.0 pg/mL   Light Blue Top    Collection Time: 02/03/19  1:52 PM   Result Value Ref Range    Extra Tube hold for add-on    Green Top (Gel)    Collection Time: 02/03/19  1:52 PM   Result Value Ref Range    Extra Tube Hold for add-ons.    Lavender Top    Collection Time: 02/03/19  1:52 PM   Result Value Ref Range    Extra Tube hold for add-on    Gold Top - SST    Collection Time: 02/03/19  1:52 PM   Result Value Ref Range    Extra Tube Hold for add-ons.    CBC Auto Differential    Collection Time: 02/03/19  1:52 PM   Result Value Ref Range    WBC 4.89 3.50 - 10.80 10*3/mm3    RBC 3.31 (L) 3.89 - 5.14 10*6/mm3    Hemoglobin 9.5 (L) 11.5 - 15.5 g/dL    Hematocrit 30.9 (L) 34.5 - 44.0 %    MCV 93.4 80.0 - 99.0 fL    MCH 28.7 27.0 - 31.0 pg    MCHC 30.7 (L) 32.0 - 36.0 g/dL    RDW 13.5  11.3 - 14.5 %    RDW-SD 46.4 37.0 - 54.0 fl    MPV 9.4 6.0 - 12.0 fL    Platelets 247 150 - 450 10*3/mm3    Neutrophil % 84.2 (H) 41.0 - 71.0 %    Lymphocyte % 7.4 (L) 24.0 - 44.0 %    Monocyte % 8.0 0.0 - 12.0 %    Eosinophil % 0.0 0.0 - 3.0 %    Basophil % 0.2 0.0 - 1.0 %    Immature Grans % 0.2 0.0 - 0.6 %    Neutrophils, Absolute 4.12 1.50 - 8.30 10*3/mm3    Lymphocytes, Absolute 0.36 (L) 0.60 - 4.80 10*3/mm3    Monocytes, Absolute 0.39 0.00 - 1.00 10*3/mm3    Eosinophils, Absolute 0.00 0.00 - 0.30 10*3/mm3    Basophils, Absolute 0.01 0.00 - 0.20 10*3/mm3    Immature Grans, Absolute 0.01 0.00 - 0.03 10*3/mm3   Troponin    Collection Time: 02/03/19  1:52 PM   Result Value Ref Range    Troponin I 0.006 <=0.039 ng/mL     Note: In addition to lab results from this visit, the labs listed above may include labs taken at another facility or during a different encounter within the last 24 hours. Please correlate lab times with ED admission and discharge times for further clarification of the services performed during this visit.    XR Chest 1 View   Preliminary Result   No acute finding.       DICTATED:   2/3/2019   EDITED/ls :   2/3/2019             Vitals:    02/03/19 1445 02/03/19 1500 02/03/19 1515 02/03/19 1530   BP: 119/96 110/71 106/67 112/70   Pulse: 83 104  97   Resp:       Temp:       TempSrc:       SpO2: 98% 97% 97% 96%   Weight:       Height:         Medications   sodium chloride 0.9 % flush 10 mL (not administered)   methylPREDNISolone sodium succinate (SOLU-Medrol) injection 80 mg (80 mg Intravenous Given 2/3/19 1415)     ECG/EMG Results (last 24 hours)     Procedure Component Value Units Date/Time    ECG 12 Lead [[id number]] Collected:  02/03/19 1331     Updated:  02/03/19 1334    Narrative:       Test Reason : soa  Blood Pressure : **/** mmHG  Vent. Rate : 116 BPM     Atrial Rate : 116 BPM     P-R Int : 146 ms          QRS Dur : 094 ms      QT Int : 312 ms       P-R-T Axes : 076 -21 097 degrees     QTc  Int : 433 ms    Sinus tachycardia with fusion complexes  ST & T wave abnormality, consider lateral ischemia  Abnormal ECG  No previous ECGs available  Confirmed by HUY FIELDS MD ([id number]) on 2/3/2019 1:34:20 PM    Referred By:  RANDEE           Confirmed By:HUY FIELDS MD        ECG 12 Lead   Final Result   Test Reason : soa   Blood Pressure : **/** mmHG   Vent. Rate : 116 BPM     Atrial Rate : 116 BPM      P-R Int : 146 ms          QRS Dur : 094 ms       QT Int : 312 ms       P-R-T Axes : 076 -21 097 degrees      QTc Int : 433 ms      Sinus tachycardia with fusion complexes   ST & T wave abnormality, consider lateral ischemia   Abnormal ECG   No previous ECGs available   Confirmed by HUY FIELDS MD ([id number]) on 2/3/2019 1:34:20 PM      Referred By:  RANDEE           Confirmed By:HUY FIELDS MD                        [facility]    Final diagnoses:   Influenza   Laryngitis       Documentation assistance provided by sung Hare.  Information recorded by the scribe was done at my direction and has been verified and validated by me.     Aminata Hare  02/03/19 1532       Kristopher Christine  02/03/19 1540       Huy Fields MD  02/03/19 0419

## 2019-02-08 ENCOUNTER — HOSPITAL ENCOUNTER (INPATIENT)
Facility: HOSPITAL | Age: 79
LOS: 3 days | Discharge: HOME | End: 2019-02-11
Attending: INTERNAL MEDICINE | Admitting: INTERNAL MEDICINE

## 2019-02-08 ENCOUNTER — APPOINTMENT (OUTPATIENT)
Dept: CARDIOLOGY | Facility: HOSPITAL | Age: 79
End: 2019-02-08

## 2019-02-08 ENCOUNTER — APPOINTMENT (OUTPATIENT)
Dept: GENERAL RADIOLOGY | Facility: HOSPITAL | Age: 79
End: 2019-02-08

## 2019-02-08 PROBLEM — N17.9 AKI (ACUTE KIDNEY INJURY) (HCC): Status: ACTIVE | Noted: 2019-02-08

## 2019-02-08 PROBLEM — J44.9 COPD (CHRONIC OBSTRUCTIVE PULMONARY DISEASE) (HCC): Status: ACTIVE | Noted: 2019-02-08

## 2019-02-08 PROBLEM — R06.03 ACUTE RESPIRATORY DISTRESS: Status: ACTIVE | Noted: 2019-02-08

## 2019-02-08 PROBLEM — I51.81 STRESS-INDUCED CARDIOMYOPATHY: Status: ACTIVE | Noted: 2019-02-08

## 2019-02-08 LAB
ALBUMIN SERPL-MCNC: 2.98 G/DL (ref 3.2–4.8)
ALBUMIN/GLOB SERPL: 1.5 G/DL (ref 1.5–2.5)
ALP SERPL-CCNC: 54 U/L (ref 25–100)
ALT SERPL W P-5'-P-CCNC: 12 U/L (ref 7–40)
ANION GAP SERPL CALCULATED.3IONS-SCNC: 3 MMOL/L (ref 3–11)
ARTICHOKE IGE QN: 50 MG/DL (ref 0–130)
AST SERPL-CCNC: 22 U/L (ref 0–33)
BASOPHILS # BLD AUTO: 0 10*3/MM3 (ref 0–0.2)
BASOPHILS NFR BLD AUTO: 0 % (ref 0–1)
BH CV ECHO MEAS - AO MAX PG (FULL): 3.9 MMHG
BH CV ECHO MEAS - AO MAX PG: 7.2 MMHG
BH CV ECHO MEAS - AO MEAN PG (FULL): 2.6 MMHG
BH CV ECHO MEAS - AO MEAN PG: 4.2 MMHG
BH CV ECHO MEAS - AO ROOT AREA (BSA CORRECTED): 1.9
BH CV ECHO MEAS - AO ROOT AREA: 5.8 CM^2
BH CV ECHO MEAS - AO ROOT DIAM: 2.7 CM
BH CV ECHO MEAS - AO V2 MAX: 134.4 CM/SEC
BH CV ECHO MEAS - AO V2 MEAN: 95.9 CM/SEC
BH CV ECHO MEAS - AO V2 VTI: 27 CM
BH CV ECHO MEAS - ASC AORTA: 3.1 CM
BH CV ECHO MEAS - AVA(I,A): 1.8 CM^2
BH CV ECHO MEAS - AVA(I,D): 1.8 CM^2
BH CV ECHO MEAS - AVA(V,A): 1.8 CM^2
BH CV ECHO MEAS - AVA(V,D): 1.8 CM^2
BH CV ECHO MEAS - BSA(HAYCOCK): 1.4 M^2
BH CV ECHO MEAS - BSA: 1.4 M^2
BH CV ECHO MEAS - BZI_BMI: 18.3 KILOGRAMS/M^2
BH CV ECHO MEAS - BZI_METRIC_HEIGHT: 157.5 CM
BH CV ECHO MEAS - BZI_METRIC_WEIGHT: 45.4 KG
BH CV ECHO MEAS - EDV(CUBED): 74.1 ML
BH CV ECHO MEAS - EDV(MOD-SP2): 95 ML
BH CV ECHO MEAS - EDV(MOD-SP4): 97 ML
BH CV ECHO MEAS - EDV(TEICH): 78.6 ML
BH CV ECHO MEAS - EF(CUBED): 59.1 %
BH CV ECHO MEAS - EF(MOD-BP): 42 %
BH CV ECHO MEAS - EF(MOD-SP2): 41.1 %
BH CV ECHO MEAS - EF(MOD-SP4): 41.2 %
BH CV ECHO MEAS - EF(TEICH): 51.1 %
BH CV ECHO MEAS - ESV(CUBED): 30.3 ML
BH CV ECHO MEAS - ESV(MOD-SP2): 56 ML
BH CV ECHO MEAS - ESV(MOD-SP4): 57 ML
BH CV ECHO MEAS - ESV(TEICH): 38.4 ML
BH CV ECHO MEAS - FS: 25.8 %
BH CV ECHO MEAS - IVS/LVPW: 0.8
BH CV ECHO MEAS - IVSD: 0.79 CM
BH CV ECHO MEAS - LA DIMENSION: 3.1 CM
BH CV ECHO MEAS - LA/AO: 1.1
BH CV ECHO MEAS - LAD MAJOR: 4.3 CM
BH CV ECHO MEAS - LAT PEAK E' VEL: 6 CM/SEC
BH CV ECHO MEAS - LATERAL E/E' RATIO: 18.7
BH CV ECHO MEAS - LV DIASTOLIC VOL/BSA (35-75): 68.1 ML/M^2
BH CV ECHO MEAS - LV MASS(C)D: 118 GRAMS
BH CV ECHO MEAS - LV MASS(C)DI: 82.9 GRAMS/M^2
BH CV ECHO MEAS - LV MAX PG: 3.3 MMHG
BH CV ECHO MEAS - LV MEAN PG: 1.6 MMHG
BH CV ECHO MEAS - LV SYSTOLIC VOL/BSA (12-30): 40 ML/M^2
BH CV ECHO MEAS - LV V1 MAX: 90.7 CM/SEC
BH CV ECHO MEAS - LV V1 MEAN: 56 CM/SEC
BH CV ECHO MEAS - LV V1 VTI: 18.5 CM
BH CV ECHO MEAS - LVIDD: 4.2 CM
BH CV ECHO MEAS - LVIDS: 3.1 CM
BH CV ECHO MEAS - LVLD AP2: 7.8 CM
BH CV ECHO MEAS - LVLD AP4: 7.1 CM
BH CV ECHO MEAS - LVLS AP2: 6.6 CM
BH CV ECHO MEAS - LVLS AP4: 7.1 CM
BH CV ECHO MEAS - LVOT AREA (M): 2.5 CM^2
BH CV ECHO MEAS - LVOT AREA: 2.7 CM^2
BH CV ECHO MEAS - LVOT DIAM: 1.8 CM
BH CV ECHO MEAS - LVPWD: 1 CM
BH CV ECHO MEAS - MED PEAK E' VEL: 5.6 CM/SEC
BH CV ECHO MEAS - MEDIAL E/E' RATIO: 20.2
BH CV ECHO MEAS - MV DEC TIME: 0.11 SEC
BH CV ECHO MEAS - MV E MAX VEL: 115.4 CM/SEC
BH CV ECHO MEAS - MV MAX PG: 8.6 MMHG
BH CV ECHO MEAS - MV MEAN PG: 3.1 MMHG
BH CV ECHO MEAS - MV V2 MAX: 146.6 CM/SEC
BH CV ECHO MEAS - MV V2 MEAN: 73 CM/SEC
BH CV ECHO MEAS - MV V2 VTI: 16.4 CM
BH CV ECHO MEAS - MVA(VTI): 3 CM^2
BH CV ECHO MEAS - PA ACC SLOPE: 708.6 CM/SEC^2
BH CV ECHO MEAS - PA ACC TIME: 0.13 SEC
BH CV ECHO MEAS - PA MAX PG: 4.9 MMHG
BH CV ECHO MEAS - PA PR(ACCEL): 21.2 MMHG
BH CV ECHO MEAS - PA V2 MAX: 110.3 CM/SEC
BH CV ECHO MEAS - RAP SYSTOLE: 8 MMHG
BH CV ECHO MEAS - RVSP: 32 MMHG
BH CV ECHO MEAS - SI(AO): 109.6 ML/M^2
BH CV ECHO MEAS - SI(CUBED): 30.8 ML/M^2
BH CV ECHO MEAS - SI(LVOT): 34.8 ML/M^2
BH CV ECHO MEAS - SI(MOD-SP2): 27.4 ML/M^2
BH CV ECHO MEAS - SI(MOD-SP4): 28.1 ML/M^2
BH CV ECHO MEAS - SI(TEICH): 28.2 ML/M^2
BH CV ECHO MEAS - SV(AO): 156.1 ML
BH CV ECHO MEAS - SV(CUBED): 43.8 ML
BH CV ECHO MEAS - SV(LVOT): 49.5 ML
BH CV ECHO MEAS - SV(MOD-SP2): 39 ML
BH CV ECHO MEAS - SV(MOD-SP4): 40 ML
BH CV ECHO MEAS - SV(TEICH): 40.1 ML
BH CV ECHO MEAS - TAPSE (>1.6): 1.2 CM2
BH CV ECHO MEAS - TR MAX PG: 24 MMHG
BH CV ECHO MEAS - TR MAX VEL: 245.1 CM/SEC
BH CV ECHO MEAS - TV MAX PG: 0.95 MMHG
BH CV ECHO MEAS - TV V2 MAX: 48.7 CM/SEC
BH CV ECHO MEASUREMENTS AVERAGE E/E' RATIO: 19.9
BH CV GRAFT BRACHIAL PRESSURE RIGHT: 125 MMHG
BH CV LEA LEFT DPA PRESSURE: 143 MMHG
BH CV LEA LEFT PTA PRESSURE: 140 MMHG
BH CV LEA RIGHT ANT TIBIAL A DISTAL PSV: 45.7 CM/S
BH CV LEA RIGHT ANT TIBIAL A MID PSV: 50.6 CM/S
BH CV LEA RIGHT ANT TIBIAL A PROX PSV: 48.2 CM/S
BH CV LEA RIGHT CFA PROX PSV: 82.7 CM/S
BH CV LEA RIGHT DFA PROX PSV: 37.5 CM/S
BH CV LEA RIGHT DPA PRESSURE: 148 MMHG
BH CV LEA RIGHT EXT ILIAC EDV: 10.1 CM/S
BH CV LEA RIGHT EXT ILIAC PSV: 149 CM/S
BH CV LEA RIGHT PERONEAL  MID PSV: 53.1 CM/S
BH CV LEA RIGHT POPITEAL A  DISTAL PSV: 61.8 CM/S
BH CV LEA RIGHT POPITEAL A  PROX PSV: 59.6 CM/S
BH CV LEA RIGHT PTA DISTAL PSV: 42.9 CM/S
BH CV LEA RIGHT PTA MID PSV: 14.3 CM/S
BH CV LEA RIGHT PTA PRESSURE: 145 MMHG
BH CV LEA RIGHT PTA PROX PSV: 14.9 CM/S
BH CV LEA RIGHT SFA DISTAL EDV: 11.6 CM/S
BH CV LEA RIGHT SFA DISTAL PSV: 80.5 CM/S
BH CV LEA RIGHT SFA MID EDV: 11.6 CM/S
BH CV LEA RIGHT SFA MID PSV: 76.6 CM/S
BH CV LEA RIGHT SFA PROX EDV: 8.82 CM/S
BH CV LEA RIGHT SFA PROX PSV: 89.9 CM/S
BH CV LOWER ARTERIAL LEFT ABI RATIO: 1.14
BH CV LOWER ARTERIAL RIGHT ABI RATIO: 1.18
BH CV XLRA - RV BASE: 2.9 CM
BH CV XLRA - RV LENGTH: 5.8 CM
BH CV XLRA - RV MID: 2.9 CM
BH CV XLRA - TDI S': 12.8 CM/SEC
BILIRUB SERPL-MCNC: 0.3 MG/DL (ref 0.3–1.2)
BNP SERPL-MCNC: 42 PG/ML (ref 0–100)
BUN BLD-MCNC: 19 MG/DL (ref 9–23)
BUN/CREAT SERPL: 16.7 (ref 7–25)
CALCIUM SPEC-SCNC: 7.5 MG/DL (ref 8.7–10.4)
CHLORIDE SERPL-SCNC: 105 MMOL/L (ref 99–109)
CHOLEST SERPL-MCNC: 113 MG/DL (ref 0–200)
CO2 SERPL-SCNC: 26 MMOL/L (ref 20–31)
CREAT BLD-MCNC: 1.14 MG/DL (ref 0.6–1.3)
D DIMER PPP FEU-MCNC: 0.62 MCGFEU/ML (ref 0–0.56)
DEPRECATED RDW RBC AUTO: 48.5 FL (ref 37–54)
EOSINOPHIL # BLD AUTO: 0.06 10*3/MM3 (ref 0–0.3)
EOSINOPHIL NFR BLD AUTO: 1 % (ref 0–3)
ERYTHROCYTE [DISTWIDTH] IN BLOOD BY AUTOMATED COUNT: 14.1 % (ref 11.3–14.5)
GFR SERPL CREATININE-BSD FRML MDRD: 46 ML/MIN/1.73
GLOBULIN UR ELPH-MCNC: 2 GM/DL
GLUCOSE BLD-MCNC: 105 MG/DL (ref 70–100)
HBA1C MFR BLD: 5.4 % (ref 4.8–5.6)
HCT VFR BLD AUTO: 27.3 % (ref 34.5–44)
HDLC SERPL-MCNC: 40 MG/DL (ref 40–60)
HGB BLD-MCNC: 8.3 G/DL (ref 11.5–15.5)
IMM GRANULOCYTES # BLD AUTO: 0.04 10*3/MM3 (ref 0–0.03)
IMM GRANULOCYTES NFR BLD AUTO: 0.7 % (ref 0–0.6)
LV EF 2D ECHO EST: 35 %
LYMPHOCYTES # BLD AUTO: 1.53 10*3/MM3 (ref 0.6–4.8)
LYMPHOCYTES NFR BLD AUTO: 26.5 % (ref 24–44)
MAXIMAL PREDICTED HEART RATE: 143 BPM
MCH RBC QN AUTO: 28.8 PG (ref 27–31)
MCHC RBC AUTO-ENTMCNC: 30.4 G/DL (ref 32–36)
MCV RBC AUTO: 94.8 FL (ref 80–99)
MONOCYTES # BLD AUTO: 0.59 10*3/MM3 (ref 0–1)
MONOCYTES NFR BLD AUTO: 10.2 % (ref 0–12)
NEUTROPHILS # BLD AUTO: 3.59 10*3/MM3 (ref 1.5–8.3)
NEUTROPHILS NFR BLD AUTO: 62.3 % (ref 41–71)
NRBC BLD AUTO-RTO: 0 /100 WBC (ref 0–0)
PLATELET # BLD AUTO: 222 10*3/MM3 (ref 150–450)
PMV BLD AUTO: 8.8 FL (ref 6–12)
POTASSIUM BLD-SCNC: 3.1 MMOL/L (ref 3.5–5.5)
PROT SERPL-MCNC: 5 G/DL (ref 5.7–8.2)
RBC # BLD AUTO: 2.88 10*6/MM3 (ref 3.89–5.14)
SODIUM BLD-SCNC: 134 MMOL/L (ref 132–146)
STRESS TARGET HR: 122 BPM
TRIGL SERPL-MCNC: 113 MG/DL (ref 0–150)
TROPONIN I SERPL-MCNC: 0.47 NG/ML
WBC NRBC COR # BLD: 5.77 10*3/MM3 (ref 3.5–10.8)

## 2019-02-08 PROCEDURE — 93458 L HRT ARTERY/VENTRICLE ANGIO: CPT | Performed by: INTERNAL MEDICINE

## 2019-02-08 PROCEDURE — 25010000002 SULFUR HEXAFLUORIDE MICROSPH 60.7-25 MG RECONSTITUTED SUSPENSION: Performed by: INTERNAL MEDICINE

## 2019-02-08 PROCEDURE — 0 IOPAMIDOL PER 1 ML: Performed by: INTERNAL MEDICINE

## 2019-02-08 PROCEDURE — 80053 COMPREHEN METABOLIC PANEL: CPT | Performed by: NURSE PRACTITIONER

## 2019-02-08 PROCEDURE — 93926 LOWER EXTREMITY STUDY: CPT

## 2019-02-08 PROCEDURE — 63710000001 PREDNISONE PER 1 MG: Performed by: INTERNAL MEDICINE

## 2019-02-08 PROCEDURE — 80061 LIPID PANEL: CPT | Performed by: NURSE PRACTITIONER

## 2019-02-08 PROCEDURE — 83036 HEMOGLOBIN GLYCOSYLATED A1C: CPT | Performed by: NURSE PRACTITIONER

## 2019-02-08 PROCEDURE — 84484 ASSAY OF TROPONIN QUANT: CPT | Performed by: NURSE PRACTITIONER

## 2019-02-08 PROCEDURE — 94760 N-INVAS EAR/PLS OXIMETRY 1: CPT

## 2019-02-08 PROCEDURE — 94799 UNLISTED PULMONARY SVC/PX: CPT

## 2019-02-08 PROCEDURE — 71045 X-RAY EXAM CHEST 1 VIEW: CPT

## 2019-02-08 PROCEDURE — C1760 CLOSURE DEV, VASC: HCPCS | Performed by: INTERNAL MEDICINE

## 2019-02-08 PROCEDURE — 99222 1ST HOSP IP/OBS MODERATE 55: CPT | Performed by: INTERNAL MEDICINE

## 2019-02-08 PROCEDURE — 83880 ASSAY OF NATRIURETIC PEPTIDE: CPT | Performed by: INTERNAL MEDICINE

## 2019-02-08 PROCEDURE — C1887 CATHETER, GUIDING: HCPCS | Performed by: INTERNAL MEDICINE

## 2019-02-08 PROCEDURE — 85379 FIBRIN DEGRADATION QUANT: CPT | Performed by: NURSE PRACTITIONER

## 2019-02-08 PROCEDURE — 93306 TTE W/DOPPLER COMPLETE: CPT

## 2019-02-08 PROCEDURE — 99223 1ST HOSP IP/OBS HIGH 75: CPT | Performed by: INTERNAL MEDICINE

## 2019-02-08 PROCEDURE — 85025 COMPLETE CBC W/AUTO DIFF WBC: CPT | Performed by: NURSE PRACTITIONER

## 2019-02-08 PROCEDURE — B2111ZZ FLUOROSCOPY OF MULTIPLE CORONARY ARTERIES USING LOW OSMOLAR CONTRAST: ICD-10-PCS | Performed by: INTERNAL MEDICINE

## 2019-02-08 PROCEDURE — C1769 GUIDE WIRE: HCPCS | Performed by: INTERNAL MEDICINE

## 2019-02-08 PROCEDURE — B41J1ZZ FLUOROSCOPY OF OTHER LOWER ARTERIES USING LOW OSMOLAR CONTRAST: ICD-10-PCS | Performed by: INTERNAL MEDICINE

## 2019-02-08 PROCEDURE — 4A023N7 MEASUREMENT OF CARDIAC SAMPLING AND PRESSURE, LEFT HEART, PERCUTANEOUS APPROACH: ICD-10-PCS | Performed by: INTERNAL MEDICINE

## 2019-02-08 PROCEDURE — 75710 ARTERY X-RAYS ARM/LEG: CPT | Performed by: INTERNAL MEDICINE

## 2019-02-08 PROCEDURE — 25010000002 HEPARIN (PORCINE) PER 1000 UNITS: Performed by: NURSE PRACTITIONER

## 2019-02-08 PROCEDURE — 25010000002 FENTANYL CITRATE (PF) 100 MCG/2ML SOLUTION: Performed by: INTERNAL MEDICINE

## 2019-02-08 PROCEDURE — 25010000002 MIDAZOLAM PER 1 MG: Performed by: INTERNAL MEDICINE

## 2019-02-08 PROCEDURE — 93306 TTE W/DOPPLER COMPLETE: CPT | Performed by: INTERNAL MEDICINE

## 2019-02-08 PROCEDURE — B2151ZZ FLUOROSCOPY OF LEFT HEART USING LOW OSMOLAR CONTRAST: ICD-10-PCS | Performed by: INTERNAL MEDICINE

## 2019-02-08 PROCEDURE — 93926 LOWER EXTREMITY STUDY: CPT | Performed by: INTERNAL MEDICINE

## 2019-02-08 PROCEDURE — C1894 INTRO/SHEATH, NON-LASER: HCPCS | Performed by: INTERNAL MEDICINE

## 2019-02-08 RX ORDER — CARVEDILOL 6.25 MG/1
6.25 TABLET ORAL EVERY 12 HOURS SCHEDULED
Status: DISCONTINUED | OUTPATIENT
Start: 2019-02-08 | End: 2019-02-09

## 2019-02-08 RX ORDER — FAMOTIDINE 20 MG/1
20 TABLET, FILM COATED ORAL DAILY
Status: DISCONTINUED | OUTPATIENT
Start: 2019-02-08 | End: 2019-02-11 | Stop reason: HOSPADM

## 2019-02-08 RX ORDER — POTASSIUM CHLORIDE 1.5 G/1.77G
40 POWDER, FOR SOLUTION ORAL AS NEEDED
Status: DISCONTINUED | OUTPATIENT
Start: 2019-02-08 | End: 2019-02-11 | Stop reason: HOSPADM

## 2019-02-08 RX ORDER — ALBUTEROL SULFATE 2.5 MG/3ML
2.5 SOLUTION RESPIRATORY (INHALATION) EVERY 6 HOURS PRN
COMMUNITY

## 2019-02-08 RX ORDER — FENTANYL CITRATE 50 UG/ML
INJECTION, SOLUTION INTRAMUSCULAR; INTRAVENOUS AS NEEDED
Status: DISCONTINUED | OUTPATIENT
Start: 2019-02-08 | End: 2019-02-08 | Stop reason: HOSPADM

## 2019-02-08 RX ORDER — POTASSIUM CHLORIDE 750 MG/1
40 CAPSULE, EXTENDED RELEASE ORAL AS NEEDED
Status: DISCONTINUED | OUTPATIENT
Start: 2019-02-08 | End: 2019-02-11 | Stop reason: HOSPADM

## 2019-02-08 RX ORDER — IPRATROPIUM BROMIDE AND ALBUTEROL SULFATE 2.5; .5 MG/3ML; MG/3ML
3 SOLUTION RESPIRATORY (INHALATION)
Status: DISCONTINUED | OUTPATIENT
Start: 2019-02-08 | End: 2019-02-09

## 2019-02-08 RX ORDER — MIDAZOLAM HYDROCHLORIDE 1 MG/ML
INJECTION INTRAMUSCULAR; INTRAVENOUS AS NEEDED
Status: DISCONTINUED | OUTPATIENT
Start: 2019-02-08 | End: 2019-02-08 | Stop reason: HOSPADM

## 2019-02-08 RX ORDER — HEPARIN SODIUM 5000 [USP'U]/ML
5000 INJECTION, SOLUTION INTRAVENOUS; SUBCUTANEOUS EVERY 8 HOURS SCHEDULED
Status: DISCONTINUED | OUTPATIENT
Start: 2019-02-08 | End: 2019-02-11 | Stop reason: HOSPADM

## 2019-02-08 RX ORDER — PREDNISONE 20 MG/1
20 TABLET ORAL DAILY
Status: DISCONTINUED | OUTPATIENT
Start: 2019-02-08 | End: 2019-02-09

## 2019-02-08 RX ORDER — TRAMADOL HYDROCHLORIDE 50 MG/1
50 TABLET ORAL EVERY 6 HOURS PRN
COMMUNITY

## 2019-02-08 RX ORDER — PREDNISONE 1 MG/1
1 TABLET ORAL 3 TIMES DAILY
COMMUNITY
End: 2019-02-22

## 2019-02-08 RX ORDER — LIDOCAINE HYDROCHLORIDE 10 MG/ML
INJECTION, SOLUTION EPIDURAL; INFILTRATION; INTRACAUDAL; PERINEURAL AS NEEDED
Status: DISCONTINUED | OUTPATIENT
Start: 2019-02-08 | End: 2019-02-08 | Stop reason: HOSPADM

## 2019-02-08 RX ADMIN — HEPARIN SODIUM 5000 UNITS: 5000 INJECTION INTRAVENOUS; SUBCUTANEOUS at 21:58

## 2019-02-08 RX ADMIN — SULFUR HEXAFLUORIDE 2 ML: KIT at 17:00

## 2019-02-08 RX ADMIN — FAMOTIDINE 20 MG: 20 TABLET, FILM COATED ORAL at 17:31

## 2019-02-08 RX ADMIN — CARVEDILOL 6.25 MG: 6.25 TABLET, FILM COATED ORAL at 21:58

## 2019-02-08 RX ADMIN — PREDNISONE 20 MG: 20 TABLET ORAL at 17:31

## 2019-02-08 RX ADMIN — POTASSIUM CHLORIDE 40 MEQ: 1.5 POWDER, FOR SOLUTION ORAL at 17:32

## 2019-02-08 RX ADMIN — IPRATROPIUM BROMIDE AND ALBUTEROL SULFATE 3 ML: 2.5; .5 SOLUTION RESPIRATORY (INHALATION) at 19:22

## 2019-02-08 RX ADMIN — POTASSIUM CHLORIDE 40 MEQ: 750 CAPSULE, EXTENDED RELEASE ORAL at 21:58

## 2019-02-08 NOTE — H&P
"  Intensive Care Admission Note     Stress-induced cardiomyopathy    History of Present Illness     This is a very nice 78-year-old woman with a past medical history significant for chronic obstructive pulmonary disease as well as vascular dementia who states that for approximately one week or so she had been having some respiratory worsening. She states that she would \"smother\" with exertion. She has denied wheezing or chest pain. She states this started approximately one week ago and she is brought to the emergency department. She notes that she was exposed to a family member with influenza and was given Tamiflu for 7 days. She currently denies any fevers or chills area and she states that she does cough but this is her baseline. She states however over the last 2 days she has noticed that her smothering sensation has gotten worse and her activity tolerance has gone down. She does not know if she has orthopnea as she does not sleep flat. She denies any swelling. She denies palpitations.    She was brought to the emergency department and initially evaluated as an ST elevation myocardial infarction and was taken to the Cath Lab. Coronary vessels were patent however on the ventriculogram it was noted that the ejection fraction was 40-45% with apical ballooning consistent with stress-induced cardiomyopathy. She was transferred to the intensive care unit for postoperative management and I have taken over her care.    CBC shows slight decrease in her hemoglobin compared to February 3. BNP has not yet been returned however I do note on the previous check on the third her creatinine was elevated at 1.5.    She is on chronic 2 L of nasal cannula oxygen. She is also on 5 mg daily of prednisone for history of possible psoriatic arthritis.    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]   • Acute respiratory distress " [R06.03]   • COPD (chronic obstructive pulmonary disease) (CMS/[facility]) [J44.9]   • GRACIE (acute kidney injury) (CMS/[facility]) [N17.9]   • Leukoaraiosis [I67.81]   • LOM (loss of memory) [R41.3]   • Cognitive impairment [R41.89]   • Vascular dementia [F01.50]   • Hypertension [I10]   • Anxiety [F41.9]   • High cholesterol [E78.00]   • Cardiac disorder [I51.9]   • Migraine [G43.909]     Past Surgical History:   Procedure Laterality Date   • CATARACT EXTRACTION      both eyes       Allergies   Allergen Reactions   • Advair Diskus [Fluticasone-Salmeterol]    • Atenolol    • Ciprofloxacin    • Clindamycin/Lincomycin      choke   • Doxazosin    • Halobetasol    • Hydrocodone    • Penicillins    • Percocet [Oxycodone-Acetaminophen]    • Sulfa Antibiotics    • Xanax [Alprazolam]      No current facility-administered medications on file prior to encounter.      Current Outpatient Medications on File Prior to Encounter   Medication Sig   • atorvastatin (LIPITOR) 80 MG tablet Daily.   • cetirizine (ZyrTEC) 10 MG tablet Daily.   • doxepin (SINEquan) 25 MG capsule Daily.   • ferrous sulfate 325 (65 FE) MG tablet Take 325 mg by mouth Daily With Breakfast.   • folic acid (FOLVITE) 1 MG tablet Daily.   • ipratropium-albuterol (DUO-NEB) 0.5-2.5 mg/3 ml nebulizer Take 3 mL by nebulization 2 (Two) Times a Day As Needed for Wheezing.   • isosorbide mononitrate (IMDUR) 30 MG 24 hr tablet Daily.   • Multiple Vitamins-Minerals (PRESERVISION AREDS 2) capsule Take  by mouth 2 (Two) Times a Day.   • oseltamivir (TAMIFLU) 30 MG capsule Take 1 capsule by mouth Every 12 (Twelve) Hours.   • predniSONE (DELTASONE) 20 MG tablet Take 1 tablet by mouth 2 (Two) Times a Day.   • predniSONE (DELTASONE) 5 MG tablet Daily.   • ranitidine (ZANTAC) 300 MG tablet Daily.   • triamterene-hydrochlorothiazide (MAXZIDE-25) 37.5-25 MG per tablet Daily.     MEDICATION LIST AND ALLERGIES REVIEWED.    Family History   Problem Relation Age of Onset   • Dementia Mother    •  Stroke Sister    • Parkinsonism Sister    • Alcohol abuse Other         Uncle   • Alzheimer's disease Other         Uncle   • Stroke Father      Social History     Tobacco Use   • Smoking status: Former Smoker     Types: Cigarettes     Last attempt to quit: 10/20/2014     Years since quittin.3   Substance Use Topics   • Alcohol use: No   • Drug use: No       Review of Systems  A full review of systems was completed with the patient and it is negative except as mentioned expressly in the HPI.    Physical Exam and Clinical Information   BP (!) 135/104 (BP Location: Right arm, Patient Position: Sitting)   Pulse 91   Temp 98 °F (36.7 °C) (Oral)   Resp 24   SpO2 99%   Physical Exam   Constitutional: She is oriented to person, place, and time. She appears well-developed. No distress.   This is a thin-appearing woman in no acute distress. She is currently wearing nasal cannula oxygen.   HENT:   Head: Normocephalic and atraumatic.   Mouth/Throat: Oropharynx is clear and moist.   Eyes: EOM are normal. Pupils are equal, round, and reactive to light.   Neck: Normal range of motion. Neck supple. No JVD present. No tracheal deviation present. No thyromegaly present.   Cardiovascular: Normal rate, regular rhythm and normal heart sounds.   Pulmonary/Chest: Effort normal.   Coarse bilateral breath sounds. No wheezes are heard.   Abdominal: Soft. Bowel sounds are normal. She exhibits no distension.   There is a left lower quadrant ostomy in place. Stoma is pink.   Musculoskeletal: Normal range of motion. She exhibits no edema, tenderness or deformity.   Lymphadenopathy:     She has no cervical adenopathy.   Neurological: She is alert and oriented to person, place, and time.   She is confused somewhat about the most recent events of her health although she is very easy to reorient.   Skin: Skin is warm. Capillary refill takes less than 2 seconds. She is not diaphoretic.   Psychiatric: She has a normal mood and affect. Her  behavior is normal.       Results from last 7 days   Lab Units 02/08/19  1300 02/03/19  1352   WBC 10*3/mm3 5.77 4.89   HEMOGLOBIN g/dL 8.3* 9.5*   PLATELETS 10*3/mm3 222 247     Results from last 7 days   Lab Units 02/03/19  1352   SODIUM mmol/L 138   POTASSIUM mmol/L 3.7   CO2 mmol/L 23.0   BUN mg/dL 22   CREATININE mg/dL 1.59*   GLUCOSE mg/dL 132*       Images: Chest x-ray from February 3 was reviewed and shows generally clear lungs. The lungs are hyperinflated consistent with chronic obstructive pulmonary disease. There is no film from today.    I reviewed the patient's results and images.     Impression     Hospital Problem List     * (Principal) Stress-induced cardiomyopathy    Vascular dementia    Hypertension    COPD (chronic obstructive pulmonary disease) (CMS/[facility])    GRACIE (acute kidney injury) (CMS/[facility])        Anemia        Chronic steroids    Plan/Recommendations     I discussed the case with Dr. Gomez and we will maximize therapy for her stress-induced cardiomyopathy.  I will order some nebulized bronchodilators and likely some increased steroids. At this time, she does not appear to be in a COPD exacerbation and it is unclear what has triggered this though it is possible that she did have a small bout of influenza which has already been treated with Tamiflu.  Follow-up labs will need to be placed for tomorrow. I note that she does have a creatinine slightly elevated at 1.59 and it is unclear to me what her baseline is.    I did discuss with her her wishes if she were to experience cardiac or respiratory arrest and she says that she would want to be resuscitated and placed on a ventilator at least for a short amount of time to determine whether or not she would be able to recover.  She will remain in the intensive care unit this evening for close observation.  She is high risk for deterioration from a cardiac and respiratory standpoint.    High level of risk due to:  severe exacerbation of chronic illness  and illness with threat to life or bodily function.        Enrique Faustin MD, [facility]  Pulmonary and Critical Care Medicine  02/08/19 3:40 PM     CC: Manju Wilson APRN

## 2019-02-08 NOTE — PLAN OF CARE
Problem: Skin Injury Risk (Adult)  Goal: Identify Related Risk Factors and Signs and Symptoms  Outcome: Ongoing (interventions implemented as appropriate)    Goal: Skin Health and Integrity  Outcome: Ongoing (interventions implemented as appropriate)      Problem: Patient Care Overview  Goal: Plan of Care Review  Outcome: Ongoing (interventions implemented as appropriate)   02/08/19 1703   Coping/Psychosocial   Plan of Care Reviewed With patient;family   Plan of Care Review   Progress improving   OTHER   Outcome Summary Diagnostic heart cath performed this afternoon, assume takotsubo. Mild confusion although baseline. Was exposed to influenza however has finished course of tamiflu. VSS, will continue to monitor.        Problem: Confusion, Acute (Adult)  Goal: Identify Related Risk Factors and Signs and Symptoms  Outcome: Ongoing (interventions implemented as appropriate)    Goal: Cognitive/Functional Impairments Minimized  Outcome: Ongoing (interventions implemented as appropriate)    Goal: Safety  Outcome: Ongoing (interventions implemented as appropriate)      Problem: Cardiac Catheterization (Diagnostic/Interventional) (Adult)  Goal: Signs and Symptoms of Listed Potential Problems Will be Absent, Minimized or Managed (Cardiac Catheterization)  Outcome: Ongoing (interventions implemented as appropriate)      Problem: Breathing Pattern Ineffective (Adult)  Goal: Identify Related Risk Factors and Signs and Symptoms  Outcome: Ongoing (interventions implemented as appropriate)    Goal: Effective Oxygenation/Ventilation  Outcome: Ongoing (interventions implemented as appropriate)    Goal: Anxiety/Fear Reduction  Outcome: Ongoing (interventions implemented as appropriate)

## 2019-02-08 NOTE — CONSULTS
[facility] AT [address], [address]  [place], [place], [address] [phone]  [email or web address]           INPATIENT CONSULTATION NOTE    Patient Care Team:  Patient Care Team:  Manju Wilson APRN as PCP - General (Family Medicine)    Reason for consultation: STEMI, dyspnea, anginal equivalent       HPI:    Olga Shine is a 78 y.o. female.  History of Present Illness     The patient has a history of hypertension, hyperlipidemia, COPD on chronic home O2, psoriatic arthritis, ovarian cancer, colostomy, and dementia.  She presented to [facility] via EMS after worsening shortness of breath. She states her shortness of breath has been progressively worsening since yesterday, to the point that she could not ambulate to the restroom without significant difficulty. She had had an albuterol nebulizer treatment prior to EMS arrival without any improvement. She was also complaining of epigastric pain at a 10/10.  EMS found her to be tachypnic on O2 @ 2L. She was given an ipratropium nebulizer treatment,  mg, and NTG SL.  With improvement of her pain to 6/10 and respiratory rate. ST elevations were noted in V4, V5, V6.  She was recently treated at [facility] ED clinically for influenza after having exposure and in the setting of shortness of breath and fever. She is a former smoker. She denies alcohol or drug use.     PFSH:  Patient Active Problem List   Diagnosis   • Leukoaraiosis   • LOM (loss of memory)   • Cognitive impairment   • Vascular dementia   • Hypertension   • Anxiety   • High cholesterol   • Cardiac disorder   • Migraine       No current facility-administered medications on file prior to encounter.      Current Outpatient Medications on File Prior to Encounter   Medication Sig Dispense Refill   • atorvastatin (LIPITOR) 80 MG tablet Daily.  4   • cetirizine (ZyrTEC) 10 MG tablet Daily.  5   • doxepin (SINEquan) 25 MG capsule Daily.  5   • ferrous sulfate 325 (65 FE) MG  tablet Take 325 mg by mouth Daily With Breakfast.     • folic acid (FOLVITE) 1 MG tablet Daily.  5   • ipratropium-albuterol (DUO-NEB) 0.5-2.5 mg/3 ml nebulizer Take 3 mL by nebulization 2 (Two) Times a Day As Needed for Wheezing.     • isosorbide mononitrate (IMDUR) 30 MG 24 hr tablet Daily.  4   • Multiple Vitamins-Minerals (PRESERVISION AREDS 2) capsule Take  by mouth 2 (Two) Times a Day.     • oseltamivir (TAMIFLU) 30 MG capsule Take 1 capsule by mouth Every 12 (Twelve) Hours. 10 capsule 0   • predniSONE (DELTASONE) 20 MG tablet Take 1 tablet by mouth 2 (Two) Times a Day. 8 tablet 0   • predniSONE (DELTASONE) 5 MG tablet Daily.  3   • ranitidine (ZANTAC) 300 MG tablet Daily.  5   • triamterene-hydrochlorothiazide (MAXZIDE-25) 37.5-25 MG per tablet Daily.  4     Allergies   Allergen Reactions   • Advair Diskus [Fluticasone-Salmeterol]    • Atenolol    • Ciprofloxacin    • Clindamycin/Lincomycin      choke   • Doxazosin    • Halobetasol    • Hydrocodone    • Penicillins    • Percocet [Oxycodone-Acetaminophen]    • Sulfa Antibiotics    • Xanax [Alprazolam]        Social History     Socioeconomic History   • Marital status:      Spouse name: Not on file   • Number of children: Not on file   • Years of education: Not on file   • Highest education level: Not on file   Tobacco Use   • Smoking status: Former Smoker     Types: Cigarettes     Last attempt to quit: 10/20/2014     Years since quittin.3   Substance and Sexual Activity   • Alcohol use: No   • Drug use: No   • Sexual activity: Defer     Family History   Problem Relation Age of Onset   • Dementia Mother    • Stroke Sister    • Parkinsonism Sister    • Alcohol abuse Other         Uncle   • Alzheimer's disease Other         Uncle   • Stroke Father        Review of Systems:  Positive for shortness of breath, epigastric pain  All other systems reviewed are negative.         Objective:     Vital Sign Min/Max for last 24 hours  No Data Recorded   BP  Min:  130/74  Max: 145/96   Pulse  Min: 91  Max: 103   Resp  Min: 18  Max: 18   SpO2  Min: 94 %  Max: 99 %   No Data Recorded    No intake or output data in the 24 hours ending 02/08/19 1507        Vitals:    02/08/19 1458   BP: 130/74   Pulse: 91   Resp: 18   SpO2: 99%       CONSTITUTIONAL: Well-nourished. In no acute distress.   SKIN: Warm and dry. No rashes noted  HEENT: Head is normocephalic and atraumatic. Mucous membranes are pink and moist.   NECK: Supple without masses or thyromegaly.   LUNGS: Normal effort. Diminished breath sounds, rhonchi present.  CARDIOVASCULAR: The heart has a tachycardic rate with a normal S1 and S2. There is no murmur, gallop, rub, or click appreciated.   PERIPHERAL VASCULAR: Carotid upstroke is 2+ bilaterally and without bruits. Radial pulses are 2+ bilaterally. Dorsalis pedis pulses are 2+ and symmetrical. There is no lower extremity edema.   ABDOMEN: Soft with no tenderness with palpitation. No hepatosplenomegaly  MUSCULOSKELETAL:  No digital cyanosis  NEUROLOGICAL: Nonfocal.  PSYCHIATRIC: Alert, orientated x 3, appropriate affect     Labs:  Lab Results   Component Value Date    TROPONINI 0.006 02/03/2019       No results found for: GLUCOSE, BUN, CREATININE, NA, K, CL, CO2, CALCIUM, PROTEINTOT, ALBUMIN, ALT, AST, ALKPHOS, BILITOT, EGFRIFNONA, LABIL2, BCR, ANIONGAP    No results found for: CHOL  No results found for: TRIG  No results found for: HDL  No results found for: LDL  No components found for: LDLDIRECTC      No results found for: WBC, RBC, HGB, HCT, MCV, MCH, MCHC, RDW, RDWSD, MPV, PLT, NEUTRORELPCT, LYMPHORELPCT, MONORELPCT, EOSRELPCT, BASORELPCT, AUTOIGPER, NEUTROABS, LYMPHSABS, MONOSABS, EOSABS, BASOSABS, AUTOIGNUM, NRBC      Diagnostic Data:    EKG EMS:  Sinus tachycardia, St elevation in V4, V5, V6, left anterior fascicular block    Tele:  Sinus tachycardia    TTE 1/2017  -Normal LV systolic function and wall motion. LVEF estimated at 55-60%  -Diastolic Dysfunction  -The  mitral valve leaflets appear normal. No prolapse or stenosis. Trace MR.  -The aortic valve is trileaflet. There is no aortic stenosis or insufficiency.  -The tricuspid valve appears structurally normal. There is mild tricuspid regurgitation.    [facility] 3/2014  a. Normal coronary arteries and normal LV systolic function.  Elevated troponin felt secondary to coronary artery spasm.           Assessment and Plan:   ASSESSMENT:  -STEMI, elevation noted in V4, V5, V6, Previously followed with Dr. Slaughter as an outpatient.   -Left anterior fascicular block  -Essential Hypertension  -Hyperlipidemia  -Possible Dementia    PLAN:  -After an assessment of patient and a review of her clinical history, it was deemed necessary to proceed with emergent cardiac catheterization. The risks, benefits, and alternatives of the procedure have been reviewed and the patient wishes to proceed.   -CBC, CMP, BNP, Troponin, HgB A1C, D-Dimer, and lipid panel.    Scribed for Dr. Gomez by ASHUTOSH Martin. 2/8/2019  3:12 PM    Electronically signed by ASHUTOSH Martin, 02/08/19, 3:12 PM.    I, Brian Gomez MD, personally performed the services as scribed by the above named individual. I have made any necessary edits and it is both accurate and complete.     Brian Gomez MD, MSc, [facility]  Interventional Cardiology  [facility] at [facility]      ADDENDUM  -Heart catheterization revealed no obstructive coronary artery disease. Left ventriculogram revealed an EF of 45-50% with evidence of Takotsubo cardiomyopathy.  -Admission to the ICU for respiratory evaluation/treatment and medical management of Takotsubo cardiomyopathy  -Transthoracic echocardiogram.  -Begin carvedilol 6.25 mg po BID. If BP tolerating beta blocker overnight would consider adding ACE/ARB tomorrow. She has previously been intolerant to atenolol due to dizziness. Holding off home Maxzide.    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 Asked to also have visualization of the right external iliac artery    Brian Gomez MD, MSc, [facility]  Interventional Cardiology  [facility] at [facility]

## 2019-02-09 ENCOUNTER — APPOINTMENT (OUTPATIENT)
Dept: GENERAL RADIOLOGY | Facility: HOSPITAL | Age: 79
End: 2019-02-09

## 2019-02-09 LAB
ANION GAP SERPL CALCULATED.3IONS-SCNC: 4 MMOL/L (ref 3–11)
BUN BLD-MCNC: 18 MG/DL (ref 9–23)
BUN/CREAT SERPL: 17 (ref 7–25)
CALCIUM SPEC-SCNC: 8.4 MG/DL (ref 8.7–10.4)
CHLORIDE SERPL-SCNC: 109 MMOL/L (ref 99–109)
CO2 SERPL-SCNC: 24 MMOL/L (ref 20–31)
CREAT BLD-MCNC: 1.06 MG/DL (ref 0.6–1.3)
DEPRECATED RDW RBC AUTO: 48.5 FL (ref 37–54)
ERYTHROCYTE [DISTWIDTH] IN BLOOD BY AUTOMATED COUNT: 13.9 % (ref 11.3–14.5)
GFR SERPL CREATININE-BSD FRML MDRD: 50 ML/MIN/1.73
GLUCOSE BLD-MCNC: 90 MG/DL (ref 70–100)
HCT VFR BLD AUTO: 34 % (ref 34.5–44)
HGB BLD-MCNC: 10.2 G/DL (ref 11.5–15.5)
MCH RBC QN AUTO: 28.7 PG (ref 27–31)
MCHC RBC AUTO-ENTMCNC: 30 G/DL (ref 32–36)
MCV RBC AUTO: 95.5 FL (ref 80–99)
PLATELET # BLD AUTO: 290 10*3/MM3 (ref 150–450)
PMV BLD AUTO: 9.5 FL (ref 6–12)
POTASSIUM BLD-SCNC: 5.6 MMOL/L (ref 3.5–5.5)
RBC # BLD AUTO: 3.56 10*6/MM3 (ref 3.89–5.14)
SODIUM BLD-SCNC: 137 MMOL/L (ref 132–146)
WBC NRBC COR # BLD: 8.49 10*3/MM3 (ref 3.5–10.8)

## 2019-02-09 PROCEDURE — 63710000001 PREDNISONE PER 1 MG: Performed by: INTERNAL MEDICINE

## 2019-02-09 PROCEDURE — 99233 SBSQ HOSP IP/OBS HIGH 50: CPT | Performed by: INTERNAL MEDICINE

## 2019-02-09 PROCEDURE — 94799 UNLISTED PULMONARY SVC/PX: CPT

## 2019-02-09 PROCEDURE — 93010 ELECTROCARDIOGRAM REPORT: CPT | Performed by: INTERNAL MEDICINE

## 2019-02-09 PROCEDURE — 71045 X-RAY EXAM CHEST 1 VIEW: CPT

## 2019-02-09 PROCEDURE — 25010000002 HEPARIN (PORCINE) PER 1000 UNITS: Performed by: NURSE PRACTITIONER

## 2019-02-09 PROCEDURE — 80048 BASIC METABOLIC PNL TOTAL CA: CPT | Performed by: INTERNAL MEDICINE

## 2019-02-09 PROCEDURE — 94760 N-INVAS EAR/PLS OXIMETRY 1: CPT

## 2019-02-09 PROCEDURE — 25010000002 FUROSEMIDE PER 20 MG: Performed by: PHYSICIAN ASSISTANT

## 2019-02-09 PROCEDURE — 85027 COMPLETE CBC AUTOMATED: CPT | Performed by: INTERNAL MEDICINE

## 2019-02-09 PROCEDURE — 93005 ELECTROCARDIOGRAM TRACING: CPT | Performed by: INTERNAL MEDICINE

## 2019-02-09 RX ORDER — ACETAMINOPHEN 500 MG
500 TABLET ORAL AS NEEDED
COMMUNITY

## 2019-02-09 RX ORDER — IPRATROPIUM BROMIDE AND ALBUTEROL SULFATE 2.5; .5 MG/3ML; MG/3ML
3 SOLUTION RESPIRATORY (INHALATION)
Status: DISCONTINUED | OUTPATIENT
Start: 2019-02-09 | End: 2019-02-11 | Stop reason: HOSPADM

## 2019-02-09 RX ORDER — PREDNISONE 10 MG/1
10 TABLET ORAL DAILY
Status: DISCONTINUED | OUTPATIENT
Start: 2019-02-10 | End: 2019-02-11 | Stop reason: HOSPADM

## 2019-02-09 RX ORDER — FUROSEMIDE 10 MG/ML
20 INJECTION INTRAMUSCULAR; INTRAVENOUS ONCE
Status: COMPLETED | OUTPATIENT
Start: 2019-02-09 | End: 2019-02-09

## 2019-02-09 RX ORDER — DOCUSATE SODIUM 100 MG/1
100 CAPSULE, LIQUID FILLED ORAL NIGHTLY
COMMUNITY

## 2019-02-09 RX ORDER — CARVEDILOL 3.12 MG/1
3.12 TABLET ORAL EVERY 12 HOURS SCHEDULED
Status: DISCONTINUED | OUTPATIENT
Start: 2019-02-09 | End: 2019-02-11

## 2019-02-09 RX ORDER — DOCUSATE SODIUM 100 MG/1
200 CAPSULE, LIQUID FILLED ORAL EVERY MORNING
COMMUNITY

## 2019-02-09 RX ORDER — POLYETHYLENE GLYCOL 3350 17 G/17G
17 POWDER, FOR SOLUTION ORAL 2 TIMES DAILY
COMMUNITY

## 2019-02-09 RX ORDER — IPRATROPIUM BROMIDE AND ALBUTEROL SULFATE 2.5; .5 MG/3ML; MG/3ML
3 SOLUTION RESPIRATORY (INHALATION) EVERY 4 HOURS PRN
Status: DISCONTINUED | OUTPATIENT
Start: 2019-02-09 | End: 2019-02-11 | Stop reason: HOSPADM

## 2019-02-09 RX ADMIN — IPRATROPIUM BROMIDE AND ALBUTEROL SULFATE 3 ML: 2.5; .5 SOLUTION RESPIRATORY (INHALATION) at 06:58

## 2019-02-09 RX ADMIN — IPRATROPIUM BROMIDE AND ALBUTEROL SULFATE 3 ML: 2.5; .5 SOLUTION RESPIRATORY (INHALATION) at 17:33

## 2019-02-09 RX ADMIN — IPRATROPIUM BROMIDE AND ALBUTEROL SULFATE 3 ML: 2.5; .5 SOLUTION RESPIRATORY (INHALATION) at 12:05

## 2019-02-09 RX ADMIN — FAMOTIDINE 20 MG: 20 TABLET, FILM COATED ORAL at 08:09

## 2019-02-09 RX ADMIN — POTASSIUM CHLORIDE 40 MEQ: 750 CAPSULE, EXTENDED RELEASE ORAL at 01:38

## 2019-02-09 RX ADMIN — IPRATROPIUM BROMIDE AND ALBUTEROL SULFATE 3 ML: 2.5; .5 SOLUTION RESPIRATORY (INHALATION) at 23:52

## 2019-02-09 RX ADMIN — HEPARIN SODIUM 5000 UNITS: 5000 INJECTION INTRAVENOUS; SUBCUTANEOUS at 13:20

## 2019-02-09 RX ADMIN — HEPARIN SODIUM 5000 UNITS: 5000 INJECTION INTRAVENOUS; SUBCUTANEOUS at 06:04

## 2019-02-09 RX ADMIN — CARVEDILOL 6.25 MG: 6.25 TABLET, FILM COATED ORAL at 08:09

## 2019-02-09 RX ADMIN — PREDNISONE 20 MG: 20 TABLET ORAL at 08:09

## 2019-02-09 RX ADMIN — IPRATROPIUM BROMIDE AND ALBUTEROL SULFATE 3 ML: 2.5; .5 SOLUTION RESPIRATORY (INHALATION) at 20:07

## 2019-02-09 RX ADMIN — HEPARIN SODIUM 5000 UNITS: 5000 INJECTION INTRAVENOUS; SUBCUTANEOUS at 21:05

## 2019-02-09 RX ADMIN — FUROSEMIDE 20 MG: 10 INJECTION, SOLUTION INTRAMUSCULAR; INTRAVENOUS at 12:08

## 2019-02-09 RX ADMIN — IPRATROPIUM BROMIDE AND ALBUTEROL SULFATE 3 ML: 2.5; .5 SOLUTION RESPIRATORY (INHALATION) at 00:33

## 2019-02-09 RX ADMIN — CARVEDILOL 3.12 MG: 3.12 TABLET, FILM COATED ORAL at 21:05

## 2019-02-09 NOTE — PROGRESS NOTES
"Critical Care Note     LOS: 1 day   Patient Care Team:  Manju Wilson APRN as PCP - General (Family Medicine)    Chief Complaint/Reason for visit:   Stress-induced cardiomyopathy  Vascular dementia  Hypertension  COPD  Acute renal insufficiency      Subjective     78-year-old woman with underlying COPD, dementia, presenting with worsening dyspnea. She had ST elevation on her EKG and was taken to the Cath Lab. Coronary arteries were patent but her EF was 40-45% with apical ballooning consistent with stress-induced cardiomyopathy.  Interval History:   Complaining of nausea today  Short of air with exertion  No productive cough or wheezing  Saturation 95% on 2 L  She takes chronic prednisone at home for psoriatic arthritis    Review of Systems:    All systems were reviewed and negative except as noted in subjective.    Medical history, surgical history, social history, family history reviewed    Objective     Intake/Output:    Intake/Output Summary (Last 24 hours) at 2/9/2019 1042  Last data filed at 2/9/2019 0700  Gross per 24 hour   Intake --   Output 1250 ml   Net -1250 ml       Nutrition:  Diet Regular; Cardiac    Infusions:       Telemetry: Sinus rhythm             Vital Signs  Blood pressure 109/80, pulse 63, temperature 98.2 °F (36.8 °C), temperature source Oral, resp. rate 22, height 157.5 cm (62.01\"), weight 45.4 kg (100 lb 1.4 oz), SpO2 97 %.    Physical Exam:  General Appearance:  Frail elderly woman in no acute distress    Head:  Temporal wasting    Eyes:          No jaundice, conjunctiva pink    Ears:     Throat: Oral mucosa moist    Neck: Trachea midline, no carotid bruit    Back:      Lungs:   Respirations are shallow, bilateral with bibasilar crackles, no wheeze or rhonchi     Heart:  Regular rhythm, S1, S2 auscultated    Abdomen:   Bowel sounds present, soft    Rectal:   Deferred   Extremities: No pitting edema or cyanosis    Pulses: Pedal pulses present    Skin: Warm and dry    Lymph nodes:  "   Neurologic: Awake and cooperative       Results Review:     I reviewed the patient's new clinical results.   Results from last 7 days   Lab Units 02/09/19  0728 02/08/19  1301 02/03/19  1352   SODIUM mmol/L 137 134 138   POTASSIUM mmol/L 5.6* 3.1* 3.7   CHLORIDE mmol/L 109 105 104   CO2 mmol/L 24.0 26.0 23.0   BUN mg/dL 18 19 22   CREATININE mg/dL 1.06 1.14 1.59*   CALCIUM mg/dL 8.4* 7.5* 8.9   BILIRUBIN mg/dL  --  0.3 0.4   ALK PHOS U/L  --  54 73   ALT (SGPT) U/L  --  12 12   AST (SGOT) U/L  --  22 18   GLUCOSE mg/dL 90 105* 132*     Results from last 7 days   Lab Units 02/09/19  0537 02/08/19  1300 02/03/19  1352   WBC 10*3/mm3 8.49 5.77 4.89   HEMOGLOBIN g/dL 10.2* 8.3* 9.5*   HEMATOCRIT % 34.0* 27.3* 30.9*   PLATELETS 10*3/mm3 290 222 247         No results found for: BLOODCX  No results found for: URINECX    I reviewed the patient's new imaging including images and reports.  Interpretation Summary     · Left ventricular systolic function is moderately decreased. Estimated EF = 35%.  · Left ventricular wall motion is abnormal. There is hypokinesis of the mid to apical segments with sparing of the basal segments. These findings are consistent with Takotsubo Cardiomyopathy.  · Left ventricular diastolic dysfunction (grade I a) consistent with impaired relaxation.  · Mild tricuspid valve regurgitation is present.        IMPRESSION:  · There is no significant occlusive coronary artery disease.  There were minimal luminal irregularities.  · Low normal ejection fraction of 45-50% with evidence of stress-induced (Takotsubo) cardiomyopathy.  The base is hyperdynamic, the mid to distal and apical segments are hypokinetic.  · There is what appears to be a well-defined healed dissection plane in the proximal segment of the right external iliac artery after the bifurcation with the right internal iliac artery. There are also surgical staples adjacent to this segment.  This finding is possibly consistent with an old  surgical or instrumentation injury to the right external iliac artery    Chest x-ray reveals lungs to be mildly hyperinflated. Cardiomegaly is present. No effusion or edema    All medications reviewed.     carvedilol 6.25 mg Oral Q12H   famotidine 20 mg Oral Daily   heparin (porcine) 5,000 Units Subcutaneous Q8H   ipratropium-albuterol 3 mL Nebulization Q6H - RT   predniSONE 20 mg Oral Daily         Assessment/Plan       Stress-induced cardiomyopathy    Vascular dementia    Hypertension    COPD (chronic obstructive pulmonary disease) (CMS/[facility])    GRACIE (acute kidney injury) (CMS/[facility])    78-year-old woman presenting with progressive dyspnea and ST elevation. She was found to have Takotsubo cardiomyopathy with an EF of 40%. Today her chest x-ray does not reveal acute edema. 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  Her serum creatinine has improved and is currently 1.06, decreased from 1.59  She wears oxygen at home, 2 L. Currently her saturations are 97% on 2 L    PLAN:  Taper prednisone to 10 mg  Continue nebulized bronchodilators  Coreg 6.25 mg twice daily  Monitor electrolytes and replace  Mobilize with assistance  Telemetry    VTE Prophylaxis: subcutaneous heparin    Stress Ulcer Prophylaxis: Elizabeth Cummings MD  02/09/19  10:42 AM      Time: 25min  I personally provided care to this critically ill patient as documented above.  Critical care time does not include time spent on separately billed procedures.  Non of my critical care time was concurrent with other critical care providers.

## 2019-02-09 NOTE — PLAN OF CARE
Problem: Patient Care Overview  Goal: Plan of Care Review  Transferred from ICU, on 2L of oxygen as per home, soa w/exertion to c. NSR, colostomy intact, confused @ times.

## 2019-02-09 NOTE — PLAN OF CARE
Problem: Skin Injury Risk (Adult)  Goal: Identify Related Risk Factors and Signs and Symptoms  Outcome: Ongoing (interventions implemented as appropriate)   02/09/19 0520   Skin Injury Risk (Adult)   Related Risk Factors (Skin Injury Risk) advanced age;cognitive impairment;critical care admission;mobility impaired     Goal: Skin Health and Integrity  Outcome: Ongoing (interventions implemented as appropriate)   02/09/19 0520   Skin Injury Risk (Adult)   Skin Health and Integrity making progress toward outcome       Problem: Patient Care Overview  Goal: Plan of Care Review  Outcome: Ongoing (interventions implemented as appropriate)   02/09/19 0520   Coping/Psychosocial   Plan of Care Reviewed With patient   Plan of Care Review   Progress improving   OTHER   Outcome Summary VSS, patient with multiple c/o shortness of air with no change in oxygenation, lung sounds, patient encouraged to do slow deep breaths and distracted with other things, tolerated BB, will continue to monitor       Problem: Confusion, Acute (Adult)  Goal: Identify Related Risk Factors and Signs and Symptoms  Outcome: Ongoing (interventions implemented as appropriate)   02/09/19 0520   Confusion, Acute (Adult)   Related Risk Factors (Acute Confusion) advanced age;cognitive impairment;mobility decreased;sleep disturbance   Signs and Symptoms (Acute Confusion) disorientation;thought process diminished/disorganized     Goal: Cognitive/Functional Impairments Minimized  Outcome: Ongoing (interventions implemented as appropriate)   02/09/19 0520   Confusion, Acute (Adult)   Cognitive/Functional Impairments Minimized making progress toward outcome     Goal: Safety  Outcome: Ongoing (interventions implemented as appropriate)   02/09/19 0520   Confusion, Acute (Adult)   Safety making progress toward outcome       Problem: Cardiac Catheterization (Diagnostic/Interventional) (Adult)  Goal: Signs and Symptoms of Listed Potential Problems Will be Absent,  Minimized or Managed (Cardiac Catheterization)  Outcome: Ongoing (interventions implemented as appropriate)   02/09/19 0520   Goal/Outcome Evaluation   Problems Assessed (Cardiac Catheterization) all   Problems Present (Cardiac Cath) none       Problem: Breathing Pattern Ineffective (Adult)  Goal: Identify Related Risk Factors and Signs and Symptoms  Outcome: Ongoing (interventions implemented as appropriate)   02/09/19 0520   Breathing Pattern Ineffective (Adult)   Related Risk Factors (Breathing Pattern Ineffective) advanced age;behavioral health issue;immobility;deconditioning;underlying condition   Signs and Symptoms (Breathing Pattern Ineffective) activity intolerance;anxiousness;breathing pattern altered;breathlessness;cough ineffective     Goal: Effective Oxygenation/Ventilation  Outcome: Ongoing (interventions implemented as appropriate)   02/09/19 0520   Breathing Pattern Ineffective (Adult)   Effective Oxygenation/Ventilation making progress toward outcome     Goal: Anxiety/Fear Reduction  Outcome: Ongoing (interventions implemented as appropriate)   02/09/19 0520   Breathing Pattern Ineffective (Adult)   Anxiety/Fear Reduction making progress toward outcome

## 2019-02-09 NOTE — PROGRESS NOTES
[facility]       LOS: 1 day   Patient Care Team:  Manju Wilson APRN as PCP - General (Family Medicine)        Subjective       Patient Denies:  Cp, palpitations.  C/o sob      Vital Signs  Temp:  [98 °F (36.7 °C)-98.3 °F (36.8 °C)] 98.2 °F (36.8 °C)  Heart Rate:  [] 69  Resp:  [18-28] 22  BP: ()/() 92/48    Intake/Output Summary (Last 24 hours) at 2/9/2019 1151  Last data filed at 2/9/2019 0700  Gross per 24 hour   Intake --   Output 1250 ml   Net -1250 ml     No intake/output data recorded.    Physical Exam:     General Appearance:    Alert, cooperative, in no acute distress       Neck:   No adenopathy, supple, trachea midline, no thyromegaly, no JVD       Lungs:     Decreased to auscultation,respirations regular, even and                  unlabored    Heart:    Regular rhythm and normal rate, normal S1 and S2, no            murmur, no gallop, no rub, no click   Chest Wall:    No abnormalities observed   Abdomen:     Normal bowel sounds, no masses, no organomegaly, soft        non-tender, non-distended       Extremities:   Moves all extremities well, no edema, no cyanosis, no             redness   Pulses:   Pulses palpable and equal bilaterally     Results Review:     I reviewed the patient's new clinical results.      WBC WBC   Date/Time Value Ref Range Status   02/09/2019 0537 8.49 3.50 - 10.80 10*3/mm3 Final   02/08/2019 1300 5.77 3.50 - 10.80 10*3/mm3 Final            HGB Hemoglobin   Date/Time Value Ref Range Status   02/09/2019 0537 10.2 (L) 11.5 - 15.5 g/dL Final   02/08/2019 1300 8.3 (L) 11.5 - 15.5 g/dL Final           HCT Hematocrit   Date/Time Value Ref Range Status   02/09/2019 0537 34.0 (L) 34.5 - 44.0 % Final   02/08/2019 1300 27.3 (L) 34.5 - 44.0 % Final            Platlets Platelets   Date/Time Value Ref Range Status   02/09/2019 0537 290 150 - 450 10*3/mm3 Final   02/08/2019 1300 222 150 - 450 10*3/mm3 Final      Sodium  Sodium   Date/Time Value Ref Range Status   02/09/2019 0728 137 132 - 146 mmol/L Final   02/08/2019 1301 134 132 - 146 mmol/L Final     Potassium  Potassium   Date/Time Value Ref Range Status   02/09/2019 0728 5.6 (H) 3.5 - 5.5 mmol/L Final   02/08/2019 1301 3.1 (L) 3.5 - 5.5 mmol/L Final     Chloride  Chloride   Date/Time Value Ref Range Status   02/09/2019 0728 109 99 - 109 mmol/L Final   02/08/2019 1301 105 99 - 109 mmol/L Final     BicarbonateNo results found for: PLASMABICARB    BUN BUN   Date/Time Value Ref Range Status   02/09/2019 0728 18 9 - 23 mg/dL Final   02/08/2019 1301 19 9 - 23 mg/dL Final      Creatinine Creatinine   Date/Time Value Ref Range Status   02/09/2019 0728 1.06 0.60 - 1.30 mg/dL Final   02/08/2019 1301 1.14 0.60 - 1.30 mg/dL Final      Calcium Calcium   Date/Time Value Ref Range Status   02/09/2019 0728 8.4 (L) 8.7 - 10.4 mg/dL Final   02/08/2019 1301 7.5 (L) 8.7 - 10.4 mg/dL Final      Mag @RESULFAST(MG:3)@        PT/INR:     No results found for: PROTIME, INR   Troponin I:    Lab Results   Component Value Date    TROPONINI 0.475 (H) 02/08/2019    TROPONINI 0.006 02/03/2019    No results found for: CKTOTAL, CKMB, CKMBINDEX, POCRTROPI, TROPONINT      carvedilol 6.25 mg Oral Q12H   famotidine 20 mg Oral Daily   heparin (porcine) 5,000 Units Subcutaneous Q8H   ipratropium-albuterol 3 mL Nebulization TID - RT   [START ON 2/10/2019] predniSONE 10 mg Oral Daily          Assessment/Plan     Patient Active Problem List   Diagnosis Code   • Leukoaraiosis I67.81   • LOM (loss of memory) R41.3   • Cognitive impairment R41.89   • Vascular dementia F01.50   • Hypertension I10   • Anxiety F41.9   • High cholesterol E78.00   • Cardiac disorder I51.9   • Migraine G43.909   • Acute respiratory distress R06.03   • Stress-induced cardiomyopathy I51.81   • COPD (chronic obstructive pulmonary disease) (CMS/[facility]) J44.9   • GRACIE (acute kidney injury) (CMS/[facility]) N17.9   EF~35%  Normal cors    Decrease  beta blocker  Diurese   Continue support    DANO Herman  02/09/19  11:51 AM

## 2019-02-10 LAB
ANION GAP SERPL CALCULATED.3IONS-SCNC: 7 MMOL/L (ref 3–11)
BUN BLD-MCNC: 28 MG/DL (ref 9–23)
BUN/CREAT SERPL: 20.6 (ref 7–25)
CALCIUM SPEC-SCNC: 9.1 MG/DL (ref 8.7–10.4)
CHLORIDE SERPL-SCNC: 99 MMOL/L (ref 99–109)
CO2 SERPL-SCNC: 28 MMOL/L (ref 20–31)
CREAT BLD-MCNC: 1.36 MG/DL (ref 0.6–1.3)
GFR SERPL CREATININE-BSD FRML MDRD: 38 ML/MIN/1.73
GLUCOSE BLD-MCNC: 87 MG/DL (ref 70–100)
MAGNESIUM SERPL-MCNC: 1.8 MG/DL (ref 1.3–2.7)
POTASSIUM BLD-SCNC: 3.9 MMOL/L (ref 3.5–5.5)
SODIUM BLD-SCNC: 134 MMOL/L (ref 132–146)

## 2019-02-10 PROCEDURE — 25010000002 ONDANSETRON PER 1 MG: Performed by: PHYSICIAN ASSISTANT

## 2019-02-10 PROCEDURE — 83735 ASSAY OF MAGNESIUM: CPT | Performed by: INTERNAL MEDICINE

## 2019-02-10 PROCEDURE — 25010000002 HEPARIN (PORCINE) PER 1000 UNITS: Performed by: NURSE PRACTITIONER

## 2019-02-10 PROCEDURE — 94640 AIRWAY INHALATION TREATMENT: CPT

## 2019-02-10 PROCEDURE — 94799 UNLISTED PULMONARY SVC/PX: CPT

## 2019-02-10 PROCEDURE — 80048 BASIC METABOLIC PNL TOTAL CA: CPT | Performed by: INTERNAL MEDICINE

## 2019-02-10 PROCEDURE — 63710000001 PREDNISONE PER 5 MG: Performed by: INTERNAL MEDICINE

## 2019-02-10 RX ORDER — DOXEPIN HYDROCHLORIDE 50 MG/1
50 CAPSULE ORAL NIGHTLY
Status: DISCONTINUED | OUTPATIENT
Start: 2019-02-10 | End: 2019-02-11 | Stop reason: HOSPADM

## 2019-02-10 RX ORDER — ONDANSETRON 2 MG/ML
4 INJECTION INTRAMUSCULAR; INTRAVENOUS EVERY 6 HOURS PRN
Status: DISCONTINUED | OUTPATIENT
Start: 2019-02-10 | End: 2019-02-11 | Stop reason: HOSPADM

## 2019-02-10 RX ORDER — ALBUTEROL SULFATE 2.5 MG/3ML
10 SOLUTION RESPIRATORY (INHALATION) CONTINUOUS
Status: DISPENSED | OUTPATIENT
Start: 2019-02-11 | End: 2019-02-11

## 2019-02-10 RX ADMIN — ONDANSETRON 4 MG: 2 INJECTION INTRAMUSCULAR; INTRAVENOUS at 13:40

## 2019-02-10 RX ADMIN — IPRATROPIUM BROMIDE AND ALBUTEROL SULFATE 3 ML: 2.5; .5 SOLUTION RESPIRATORY (INHALATION) at 04:42

## 2019-02-10 RX ADMIN — HEPARIN SODIUM 5000 UNITS: 5000 INJECTION INTRAVENOUS; SUBCUTANEOUS at 08:44

## 2019-02-10 RX ADMIN — ONDANSETRON 4 MG: 2 INJECTION INTRAMUSCULAR; INTRAVENOUS at 18:54

## 2019-02-10 RX ADMIN — HEPARIN SODIUM 5000 UNITS: 5000 INJECTION INTRAVENOUS; SUBCUTANEOUS at 13:44

## 2019-02-10 RX ADMIN — IPRATROPIUM BROMIDE AND ALBUTEROL SULFATE 3 ML: 2.5; .5 SOLUTION RESPIRATORY (INHALATION) at 23:13

## 2019-02-10 RX ADMIN — IPRATROPIUM BROMIDE AND ALBUTEROL SULFATE 3 ML: 2.5; .5 SOLUTION RESPIRATORY (INHALATION) at 12:03

## 2019-02-10 RX ADMIN — CARVEDILOL 3.12 MG: 3.12 TABLET, FILM COATED ORAL at 21:18

## 2019-02-10 RX ADMIN — FAMOTIDINE 20 MG: 20 TABLET, FILM COATED ORAL at 08:45

## 2019-02-10 RX ADMIN — CARVEDILOL 3.12 MG: 3.12 TABLET, FILM COATED ORAL at 08:47

## 2019-02-10 RX ADMIN — IPRATROPIUM BROMIDE AND ALBUTEROL SULFATE 3 ML: 2.5; .5 SOLUTION RESPIRATORY (INHALATION) at 16:22

## 2019-02-10 RX ADMIN — IPRATROPIUM BROMIDE AND ALBUTEROL SULFATE 3 ML: 2.5; .5 SOLUTION RESPIRATORY (INHALATION) at 07:46

## 2019-02-10 RX ADMIN — ALBUTEROL SULFATE 10 MG: 2.5 SOLUTION RESPIRATORY (INHALATION) at 23:34

## 2019-02-10 RX ADMIN — HEPARIN SODIUM 5000 UNITS: 5000 INJECTION INTRAVENOUS; SUBCUTANEOUS at 21:19

## 2019-02-10 RX ADMIN — PREDNISONE 10 MG: 10 TABLET ORAL at 08:45

## 2019-02-10 RX ADMIN — IPRATROPIUM BROMIDE AND ALBUTEROL SULFATE 3 ML: 2.5; .5 SOLUTION RESPIRATORY (INHALATION) at 19:39

## 2019-02-10 RX ADMIN — DOXEPIN HYDROCHLORIDE 50 MG: 50 CAPSULE ORAL at 21:18

## 2019-02-10 NOTE — PROGRESS NOTES
[facility]       LOS: 2 days   Patient Care Team:  Manju Wilson APRN as PCP - General (Family Medicine)        Subjective       Patient Denies:  Cp, palpitations.  C/o sob      Vital Signs  Temp:  [97.9 °F (36.6 °C)-98 °F (36.7 °C)] 98 °F (36.7 °C)  Heart Rate:  [67-94] 93  Resp:  [18-28] 18  BP: (108-127)/(72-91) 108/72    Intake/Output Summary (Last 24 hours) at 2/10/2019 1158  Last data filed at 2/10/2019 0011  Gross per 24 hour   Intake 60 ml   Output 450 ml   Net -390 ml     No intake/output data recorded.    Physical Exam:     General Appearance:    Alert, cooperative, in no acute distress       Neck:   No adenopathy, supple, trachea midline, no thyromegaly, no JVD       Lungs:     Decreased to auscultation,respirations regular, even and                  unlabored    Heart:    Regular rhythm and normal rate, normal S1 and S2, no            murmur, no gallop, no rub, no click   Chest Wall:    No abnormalities observed   Abdomen:     Normal bowel sounds, no masses, no organomegaly, soft        non-tender, non-distended       Extremities:   Moves all extremities well, no edema, no cyanosis, no             redness   Pulses:   Pulses palpable and equal bilaterally     Results Review:     I reviewed the patient's new clinical results.      WBC WBC   Date/Time Value Ref Range Status   02/09/2019 0537 8.49 3.50 - 10.80 10*3/mm3 Final   02/08/2019 1300 5.77 3.50 - 10.80 10*3/mm3 Final            HGB Hemoglobin   Date/Time Value Ref Range Status   02/09/2019 0537 10.2 (L) 11.5 - 15.5 g/dL Final   02/08/2019 1300 8.3 (L) 11.5 - 15.5 g/dL Final           HCT Hematocrit   Date/Time Value Ref Range Status   02/09/2019 0537 34.0 (L) 34.5 - 44.0 % Final   02/08/2019 1300 27.3 (L) 34.5 - 44.0 % Final            Platlets Platelets   Date/Time Value Ref Range Status   02/09/2019 0537 290 150 - 450 10*3/mm3 Final   02/08/2019 1300 222 150 - 450 10*3/mm3 Final      Sodium  Sodium   Date/Time Value Ref Range Status   02/10/2019 0548 134 132 - 146 mmol/L Final   02/09/2019 0728 137 132 - 146 mmol/L Final   02/08/2019 1301 134 132 - 146 mmol/L Final     Potassium  Potassium   Date/Time Value Ref Range Status   02/10/2019 0548 3.9 3.5 - 5.5 mmol/L Final   02/09/2019 0728 5.6 (H) 3.5 - 5.5 mmol/L Final   02/08/2019 1301 3.1 (L) 3.5 - 5.5 mmol/L Final     Chloride  Chloride   Date/Time Value Ref Range Status   02/10/2019 0548 99 99 - 109 mmol/L Final   02/09/2019 0728 109 99 - 109 mmol/L Final   02/08/2019 1301 105 99 - 109 mmol/L Final     BicarbonateNo results found for: PLASMABICARB    BUN BUN   Date/Time Value Ref Range Status   02/10/2019 0548 28 (H) 9 - 23 mg/dL Final   02/09/2019 0728 18 9 - 23 mg/dL Final   02/08/2019 1301 19 9 - 23 mg/dL Final      Creatinine Creatinine   Date/Time Value Ref Range Status   02/10/2019 0548 1.36 (H) 0.60 - 1.30 mg/dL Final   02/09/2019 0728 1.06 0.60 - 1.30 mg/dL Final   02/08/2019 1301 1.14 0.60 - 1.30 mg/dL Final      Calcium Calcium   Date/Time Value Ref Range Status   02/10/2019 0548 9.1 8.7 - 10.4 mg/dL Final   02/09/2019 0728 8.4 (L) 8.7 - 10.4 mg/dL Final   02/08/2019 1301 7.5 (L) 8.7 - 10.4 mg/dL Final      Mag @RESULFAST(MG:3)@        PT/INR:     No results found for: PROTIME, INR   Troponin I:    Lab Results   Component Value Date    TROPONINI 0.475 (H) 02/08/2019    TROPONINI 0.006 02/03/2019    No results found for: CKTOTAL, CKMB, CKMBINDEX, POCRTROPI, TROPONINT      carvedilol 3.125 mg Oral Q12H   doxepin 50 mg Oral Nightly   famotidine 20 mg Oral Daily   heparin (porcine) 5,000 Units Subcutaneous Q8H   ipratropium-albuterol 3 mL Nebulization TID - RT   predniSONE 10 mg Oral Daily          Assessment/Plan     Patient Active Problem List   Diagnosis Code   • Leukoaraiosis I67.81   • LOM (loss of memory) R41.3   • Cognitive impairment R41.89   • Vascular dementia F01.50   • Hypertension I10   • Anxiety F41.9   • High  cholesterol E78.00   • Cardiac disorder I51.9   • Migraine G43.909   • Acute respiratory distress R06.03   • Stress-induced cardiomyopathy I51.81   • COPD (chronic obstructive pulmonary disease) (CMS/[facility]) J44.9   • GRACIE (acute kidney injury) (CMS/Formerly [facility]) N17.9   EF~35%  Normal cors      Dihermelinda   Continue support  Dr. Gomez to see in [facility]  02/10/19  11:58 AM

## 2019-02-11 ENCOUNTER — DOCUMENTATION (OUTPATIENT)
Dept: CARDIAC REHAB | Facility: HOSPITAL | Age: 79
End: 2019-02-11

## 2019-02-11 ENCOUNTER — APPOINTMENT (OUTPATIENT)
Dept: GENERAL RADIOLOGY | Facility: HOSPITAL | Age: 79
End: 2019-02-11

## 2019-02-11 VITALS
BODY MASS INDEX: 17.32 KG/M2 | HEART RATE: 88 BPM | TEMPERATURE: 98.6 F | OXYGEN SATURATION: 97 % | HEIGHT: 62 IN | WEIGHT: 94.14 LBS | SYSTOLIC BLOOD PRESSURE: 93 MMHG | RESPIRATION RATE: 24 BRPM | DIASTOLIC BLOOD PRESSURE: 68 MMHG

## 2019-02-11 LAB
ARTERIAL PATENCY WRIST A: ABNORMAL
ATMOSPHERIC PRESS: ABNORMAL MMHG
BASE EXCESS BLDA CALC-SCNC: 1.8 MMOL/L (ref 0–2)
BDY SITE: ABNORMAL
BODY TEMPERATURE: 37 C
CO2 BLDA-SCNC: 26.3 MMOL/L (ref 23–27)
COHGB MFR BLD: 1.2 % (ref 0–2)
HCO3 BLDA-SCNC: 25.2 MMOL/L (ref 20–26)
HCT VFR BLD CALC: 35 %
HGB BLDA-MCNC: 11.4 G/DL (ref 14–18)
HOROWITZ INDEX BLD+IHG-RTO: 28 %
METHGB BLD QL: 0.5 % (ref 0–1.5)
MODALITY: ABNORMAL
NOTE: ABNORMAL
OXYHGB MFR BLDV: 93.7 % (ref 94–99)
PCO2 BLDA: 34.5 MM HG (ref 35–45)
PCO2 TEMP ADJ BLD: 34.5 MM HG (ref 35–45)
PH BLDA: 7.47 PH UNITS (ref 7.35–7.45)
PH, TEMP CORRECTED: 7.47 PH UNITS
PO2 BLDA: 73.6 MM HG (ref 83–108)
PO2 TEMP ADJ BLD: 73.6 MM HG (ref 83–108)
VENTILATOR MODE: ABNORMAL

## 2019-02-11 PROCEDURE — 82805 BLOOD GASES W/O2 SATURATION: CPT

## 2019-02-11 PROCEDURE — 94799 UNLISTED PULMONARY SVC/PX: CPT

## 2019-02-11 PROCEDURE — 99232 SBSQ HOSP IP/OBS MODERATE 35: CPT | Performed by: INTERNAL MEDICINE

## 2019-02-11 PROCEDURE — 25010000002 ONDANSETRON PER 1 MG: Performed by: PHYSICIAN ASSISTANT

## 2019-02-11 PROCEDURE — 25010000002 HEPARIN (PORCINE) PER 1000 UNITS: Performed by: NURSE PRACTITIONER

## 2019-02-11 PROCEDURE — 36600 WITHDRAWAL OF ARTERIAL BLOOD: CPT

## 2019-02-11 PROCEDURE — 71045 X-RAY EXAM CHEST 1 VIEW: CPT

## 2019-02-11 PROCEDURE — 63710000001 PREDNISONE PER 5 MG: Performed by: INTERNAL MEDICINE

## 2019-02-11 RX ORDER — HYDROXYZINE HYDROCHLORIDE 10 MG/1
10 TABLET, FILM COATED ORAL EVERY 6 HOURS PRN
Qty: 60 TABLET | Refills: 1 | Status: SHIPPED | OUTPATIENT
Start: 2019-02-11 | End: 2019-02-11

## 2019-02-11 RX ORDER — HYDROXYZINE HYDROCHLORIDE 10 MG/1
10 TABLET, FILM COATED ORAL EVERY 6 HOURS PRN
Qty: 60 TABLET | Refills: 1 | Status: SHIPPED | OUTPATIENT
Start: 2019-02-11 | End: 2019-02-22 | Stop reason: ALTCHOICE

## 2019-02-11 RX ORDER — GUAIFENESIN 600 MG/1
600 TABLET, EXTENDED RELEASE ORAL EVERY 12 HOURS SCHEDULED
Status: DISCONTINUED | OUTPATIENT
Start: 2019-02-11 | End: 2019-02-11 | Stop reason: HOSPADM

## 2019-02-11 RX ORDER — HYDROXYZINE HYDROCHLORIDE 10 MG/1
10 TABLET, FILM COATED ORAL EVERY 6 HOURS PRN
Status: DISCONTINUED | OUTPATIENT
Start: 2019-02-11 | End: 2019-02-11 | Stop reason: HOSPADM

## 2019-02-11 RX ADMIN — GUAIFENESIN 600 MG: 600 TABLET, EXTENDED RELEASE ORAL at 05:03

## 2019-02-11 RX ADMIN — PREDNISONE 10 MG: 10 TABLET ORAL at 08:40

## 2019-02-11 RX ADMIN — HYDROXYZINE HYDROCHLORIDE 10 MG: 10 TABLET, FILM COATED ORAL at 12:10

## 2019-02-11 RX ADMIN — IPRATROPIUM BROMIDE AND ALBUTEROL SULFATE 3 ML: 2.5; .5 SOLUTION RESPIRATORY (INHALATION) at 05:18

## 2019-02-11 RX ADMIN — HEPARIN SODIUM 5000 UNITS: 5000 INJECTION INTRAVENOUS; SUBCUTANEOUS at 05:02

## 2019-02-11 RX ADMIN — FAMOTIDINE 20 MG: 20 TABLET, FILM COATED ORAL at 08:40

## 2019-02-11 RX ADMIN — IPRATROPIUM BROMIDE AND ALBUTEROL SULFATE 3 ML: 2.5; .5 SOLUTION RESPIRATORY (INHALATION) at 08:13

## 2019-02-11 RX ADMIN — ONDANSETRON 4 MG: 2 INJECTION INTRAMUSCULAR; INTRAVENOUS at 09:19

## 2019-02-11 NOTE — PAYOR COMM NOTE
"Id # [id number]  Krissy Donahue RN, BSN  Phone # [phone]  Fax # [phone]  Kishor Shine (78 y.o. Female)     Date of Birth Social Security Number Address Home Phone MRN    1940  [id number]  [facility] [id number] [phone] [id number]    Alevism Marital Status          None        Admission Date Admission Type Admitting Provider Attending Provider Department, Room/Bed    2/8/19 Urgent Brian Gomez MD Shih, Michael, MD [facility] 6A, N617/1    Discharge Date Discharge Disposition Discharge Destination         Home or Self Care              Attending Provider:  Brian Gomez MD    Allergies:  Atenolol, Clindamycin/lincomycin, Doxazosin, Fluticasone-salmeterol, Halobetasol, Hydrocodone, Penicillins, Percocet [Oxycodone-acetaminophen], Sulfa Antibiotics, Xanax [Alprazolam], Ciprofloxacin, Halobetasol Propionate, Pneumococcal Vaccines    Isolation:  None   Infection:  None   Code Status:  CPR    Ht:  157.5 cm (62.01\")   Wt:  42.7 kg (94 lb 2.2 oz)    Admission Cmt:  None   Principal Problem:  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]                 Active Insurance as of 2/8/2019     Primary Coverage     Payor Plan Insurance Group Employer/Plan Group    [employer] MEDICA[employer] [id number]     Payor Plan Address Payor Plan Phone Number Payor Plan Fax Number Effective Dates    PO  [phone]  2/3/2019 - None Entered    [facility] [id number]       Subscriber Name Subscriber Birth Date Member ID       KISHOR SHINE 1940 [id number]                 Emergency Contacts      (Rel.) Home Phone Work Phone Mobile Phone    JEANNETTE RUIZ (Grandchild) -- -- [phone]               History & Physical      Enrique Faustin MD at 2/8/2019  3:39 PM            Intensive Care Admission Note     Stress-induced cardiomyopathy    History of Present Illness     This is a very nice 78-year-old woman with a past medical history significant for chronic " "obstructive pulmonary disease as well as vascular dementia who states that for approximately one week or so she had been having some respiratory worsening. She states that she would \"smother\" with exertion. She has denied wheezing or chest pain. She states this started approximately one week ago and she is brought to the emergency department. She notes that she was exposed to a family member with influenza and was given Tamiflu for 7 days. She currently denies any fevers or chills area and she states that she does cough but this is her baseline. She states however over the last 2 days she has noticed that her smothering sensation has gotten worse and her activity tolerance has gone down. She does not know if she has orthopnea as she does not sleep flat. She denies any swelling. She denies palpitations.    She was brought to the emergency department and initially evaluated as an ST elevation myocardial infarction and was taken to the Cath Lab. Coronary vessels were patent however on the ventriculogram it was noted that the ejection fraction was 40-45% with apical ballooning consistent with stress-induced cardiomyopathy. She was transferred to the intensive care unit for postoperative management and I have taken over her care.    CBC shows slight decrease in her hemoglobin compared to February 3. BNP has not yet been returned however I do note on the previous check on the third her creatinine was elevated at 1.5.    She is on chronic 2 L of nasal cannula oxygen. She is also on 5 mg daily of prednisone for history of possible psoriatic arthritis.    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]   • LOM (loss of memory) [R41.3]   • " Cognitive impairment [R41.89]   • Vascular dementia [F01.50]   • Hypertension [I10]   • Anxiety [F41.9]   • High cholesterol [E78.00]   • Cardiac disorder [I51.9]   • Migraine [G43.909]     Past Surgical History:   Procedure Laterality Date   • CATARACT EXTRACTION      both eyes       Allergies   Allergen Reactions   • Advair Diskus [Fluticasone-Salmeterol]    • Atenolol    • Ciprofloxacin    • Clindamycin/Lincomycin      choke   • Doxazosin    • Halobetasol    • Hydrocodone    • Penicillins    • Percocet [Oxycodone-Acetaminophen]    • Sulfa Antibiotics    • Xanax [Alprazolam]      No current facility-administered medications on file prior to encounter.      Current Outpatient Medications on File Prior to Encounter   Medication Sig   • atorvastatin (LIPITOR) 80 MG tablet Daily.   • cetirizine (ZyrTEC) 10 MG tablet Daily.   • doxepin (SINEquan) 25 MG capsule Daily.   • ferrous sulfate 325 (65 FE) MG tablet Take 325 mg by mouth Daily With Breakfast.   • folic acid (FOLVITE) 1 MG tablet Daily.   • ipratropium-albuterol (DUO-NEB) 0.5-2.5 mg/3 ml nebulizer Take 3 mL by nebulization 2 (Two) Times a Day As Needed for Wheezing.   • isosorbide mononitrate (IMDUR) 30 MG 24 hr tablet Daily.   • Multiple Vitamins-Minerals (PRESERVISION AREDS 2) capsule Take  by mouth 2 (Two) Times a Day.   • oseltamivir (TAMIFLU) 30 MG capsule Take 1 capsule by mouth Every 12 (Twelve) Hours.   • predniSONE (DELTASONE) 20 MG tablet Take 1 tablet by mouth 2 (Two) Times a Day.   • predniSONE (DELTASONE) 5 MG tablet Daily.   • ranitidine (ZANTAC) 300 MG tablet Daily.   • triamterene-hydrochlorothiazide (MAXZIDE-25) 37.5-25 MG per tablet Daily.     MEDICATION LIST AND ALLERGIES REVIEWED.    Family History   Problem Relation Age of Onset   • Dementia Mother    • Stroke Sister    • Parkinsonism Sister    • Alcohol abuse Other         Uncle   • Alzheimer's disease Other         Uncle   • Stroke Father      Social History     Tobacco Use   • Smoking  status: Former Smoker     Types: Cigarettes     Last attempt to quit: 10/20/2014     Years since quittin.3   Substance Use Topics   • Alcohol use: No   • Drug use: No       Review of Systems  A full review of systems was completed with the patient and it is negative except as mentioned expressly in the HPI.    Physical Exam and Clinical Information   BP (!) 135/104 (BP Location: Right arm, Patient Position: Sitting)   Pulse 91   Temp 98 °F (36.7 °C) (Oral)   Resp 24   SpO2 99%   Physical Exam   Constitutional: She is oriented to person, place, and time. She appears well-developed. No distress.   This is a thin-appearing woman in no acute distress. She is currently wearing nasal cannula oxygen.   HENT:   Head: Normocephalic and atraumatic.   Mouth/Throat: Oropharynx is clear and moist.   Eyes: EOM are normal. Pupils are equal, round, and reactive to light.   Neck: Normal range of motion. Neck supple. No JVD present. No tracheal deviation present. No thyromegaly present.   Cardiovascular: Normal rate, regular rhythm and normal heart sounds.   Pulmonary/Chest: Effort normal.   Coarse bilateral breath sounds. No wheezes are heard.   Abdominal: Soft. Bowel sounds are normal. She exhibits no distension.   There is a left lower quadrant ostomy in place. Stoma is pink.   Musculoskeletal: Normal range of motion. She exhibits no edema, tenderness or deformity.   Lymphadenopathy:     She has no cervical adenopathy.   Neurological: She is alert and oriented to person, place, and time.   She is confused somewhat about the most recent events of her health although she is very easy to reorient.   Skin: Skin is warm. Capillary refill takes less than 2 seconds. She is not diaphoretic.   Psychiatric: She has a normal mood and affect. Her behavior is normal.       Results from last 7 days   Lab Units 19  1300 19  1352   WBC 10*3/mm3 5.77 4.89   HEMOGLOBIN g/dL 8.3* 9.5*   PLATELETS 10*3/mm3 222 247     Results from  last 7 days   Lab Units 02/03/19  1352   SODIUM mmol/L 138   POTASSIUM mmol/L 3.7   CO2 mmol/L 23.0   BUN mg/dL 22   CREATININE mg/dL 1.59*   GLUCOSE mg/dL 132*       Images: Chest x-ray from February 3 was reviewed and shows generally clear lungs. The lungs are hyperinflated consistent with chronic obstructive pulmonary disease. There is no film from today.    I reviewed the patient's results and images.     Impression     Hospital Problem List     * (Principal) Stress-induced cardiomyopathy    Vascular dementia    Hypertension    COPD (chronic obstructive pulmonary disease) (CMS/[facility])    GRACIE (acute kidney injury) (CMS/[facility])        Anemia        Chronic steroids    Plan/Recommendations     I discussed the case with Dr. Gomez and we will maximize therapy for her stress-induced cardiomyopathy.  I will order some nebulized bronchodilators and likely some increased steroids. At this time, she does not appear to be in a COPD exacerbation and it is unclear what has triggered this though it is possible that she did have a small bout of influenza which has already been treated with Tamiflu.  Follow-up labs will need to be placed for tomorrow. I note that she does have a creatinine slightly elevated at 1.59 and it is unclear to me what her baseline is.    I did discuss with her her wishes if she were to experience cardiac or respiratory arrest and she says that she would want to be resuscitated and placed on a ventilator at least for a short amount of time to determine whether or not she would be able to recover.  She will remain in the intensive care unit this evening for close observation.  She is high risk for deterioration from a cardiac and respiratory standpoint.    High level of risk due to:  severe exacerbation of chronic illness and illness with threat to life or bodily function.        Enrique Faustin MD, [facility]  Pulmonary and Critical Care Medicine  02/08/19 3:40 PM     CC: Manju Wilson APRN    Electronically  "signed by Enrique Faustin MD at 2/8/2019  3:52 PM          Physician Progress Notes (last 7 days) (Notes from 2/4/2019 11:40 AM through 2/11/2019 11:40 AM)      Remedios Slaughter MD at 2/11/2019  8:59 AM           [facility] - discharge Note    Olga Shine  1940  N617/1    02/11/19, 8:59 AM    Chief Complaint: Following for elevated troponin, Takotsubo CM, Tachycardia    Subjective:   Sitting up in bed.  Somewhat anxious.  There is concern that she will have persistent anxiety on discharge to home.  Currently on 2 L as she has home.  She is currently on 5 mg of prednisone at home for her COPD.    Review of Systems:  Pertinent positives are listed above and in physical exam.  All others have been reviewed and are negative.    Current Hospital Meds:   carvedilol 3.125 mg Oral Q12H   doxepin 50 mg Oral Nightly   famotidine 20 mg Oral Daily   guaiFENesin 600 mg Oral Q12H   heparin (porcine) 5,000 Units Subcutaneous Q8H   ipratropium-albuterol 3 mL Nebulization TID - RT   predniSONE 10 mg Oral Daily       Objective:  Vitals:   height is 157.5 cm (62.01\") and weight is 42.7 kg (94 lb 2.2 oz). Her oral temperature is 98.6 °F (37 °C). Her blood pressure is 93/68 and her pulse is 88. Her respiration is 24 and oxygen saturation is 97%.     Intake/Output Summary (Last 24 hours) at 2/11/2019 0859  Last data filed at 2/10/2019 2025  Gross per 24 hour   Intake 240 ml   Output 725 ml   Net -485 ml       Physical Exam:  General: Alert and oriented  Neck: Jugular venous pressure is within normal limits. Carotids have normal upstrokes without bruits.   Cardiovascular: Heart has a nondisplaced focal PMI. Regular rate and rhythm without murmur, gallop or rub.  Lungs: Bilateral expiratory wheezes and rhonchi are heard.  Extremities: Show no edema.  Skin: warm and dry.  Neurologic: nonfocal             Results from last 7 days   Lab Units 02/09/19  0537   WBC 10*3/mm3 8.49   HEMOGLOBIN g/dL " 10.2*   HEMATOCRIT % 34.0*   PLATELETS 10*3/mm3 290     Results from last 7 days   Lab Units 02/10/19  0548  02/08/19  1301   SODIUM mmol/L 134   < > 134   POTASSIUM mmol/L 3.9   < > 3.1*   CHLORIDE mmol/L 99   < > 105   CO2 mmol/L 28.0   < > 26.0   BUN mg/dL 28*   < > 19   CREATININE mg/dL 1.36*   < > 1.14   CALCIUM mg/dL 9.1   < > 7.5*   BILIRUBIN mg/dL  --   --  0.3   ALK PHOS U/L  --   --  54   ALT (SGPT) U/L  --   --  12   AST (SGOT) U/L  --   --  22   GLUCOSE mg/dL 87   < > 105*    < > = values in this interval not displayed.             Results from last 7 days   Lab Units 02/08/19  1301   CHOLESTEROL mg/dL 113   TRIGLYCERIDES mg/dL 113   HDL CHOL mg/dL 40   LDL CHOL mg/dL 50           Tele:  NSR    Assessment and Plan:  1.  Elevated Troponin      - Heart catheterization revealed no obstructive coronary artery disease. Left ventriculogram revealed an EF of 45-50% with evidence of Takotsubo cardiomyopathy.    -  TTE 2/8/19, Dr. Gomez:  EF 35%, hypokinesis c/w Takotsubo CM, grade Ia diastolic dysfunction, mild TR.    -  Currently on Coreg only secondary to hypotensive BP readings.  It is being held       2.  Sinus Tachycardia    -  Not receiving Coreg due to hypotension   -  Continue to monitor.    3. COPD with exacerbation   -  Leukocytosis resolved   -  Per MDs.  4.  GRACIE   -  Labs personally reviewed   -  Monitor volume intake/output    5.  Dementia   -  Some confusion noted during exam this morning   -  Per Hospitalists, PCP.      DANO Kimball  02/11/19, 8:59 AM    Discharge home today is okay with me.  I will place her on hydroxyzine 10 mg q 6 prn due to fears of anxiety.  Her discharge medications will be doxepin 50 mg by mouth daily at bedtime, Pepcid 20 mg by mouth daily, prednisone 5 mg by mouth daily as a home and do a neb when necessary as well as Mucinex 12 hours 600 mg q 12.  She can continue her Tylenol, DOS, MiraLAX, tramadol, atorvastatin, Zyrtec, ferrous sulfate, folic acid,  PreserVision, Zantac.    No need for Maxide at discharge.  No need for isosorbide  Follow-up with me in [place] in one month  The patient is stable, appropriate for discharge home, and follow-up has been arranged.    I Remedios Slaughter MD personally performed the services described in this documentation as scribed by the above individual in my presence, and it is both accurate and complete.    Remedios Slaughter MD, FACC                    Electronically signed by Remedios Slaughter MD at 2/11/2019 11:08 AM     Archie Hassan MD at 2/10/2019 11:58 AM                                                         [facility]       LOS: 2 days   Patient Care Team:  Manju Wilson APRN as PCP - General (Family Medicine)        Subjective       Patient Denies:  Cp, palpitations.  C/o sob      Vital Signs  Temp:  [97.9 °F (36.6 °C)-98 °F (36.7 °C)] 98 °F (36.7 °C)  Heart Rate:  [67-94] 93  Resp:  [18-28] 18  BP: (108-127)/(72-91) 108/72    Intake/Output Summary (Last 24 hours) at 2/10/2019 1158  Last data filed at 2/10/2019 0011  Gross per 24 hour   Intake 60 ml   Output 450 ml   Net -390 ml     No intake/output data recorded.    Physical Exam:     General Appearance:    Alert, cooperative, in no acute distress       Neck:   No adenopathy, supple, trachea midline, no thyromegaly, no JVD       Lungs:     Decreased to auscultation,respirations regular, even and                  unlabored    Heart:    Regular rhythm and normal rate, normal S1 and S2, no            murmur, no gallop, no rub, no click   Chest Wall:    No abnormalities observed   Abdomen:     Normal bowel sounds, no masses, no organomegaly, soft        non-tender, non-distended       Extremities:   Moves all extremities well, no edema, no cyanosis, no             redness   Pulses:   Pulses palpable and equal bilaterally     Results Review:     I reviewed the patient's new clinical results.      WBC WBC   Date/Time Value Ref Range  Status   02/09/2019 0537 8.49 3.50 - 10.80 10*3/mm3 Final   02/08/2019 1300 5.77 3.50 - 10.80 10*3/mm3 Final            HGB Hemoglobin   Date/Time Value Ref Range Status   02/09/2019 0537 10.2 (L) 11.5 - 15.5 g/dL Final   02/08/2019 1300 8.3 (L) 11.5 - 15.5 g/dL Final           HCT Hematocrit   Date/Time Value Ref Range Status   02/09/2019 0537 34.0 (L) 34.5 - 44.0 % Final   02/08/2019 1300 27.3 (L) 34.5 - 44.0 % Final            Platlets Platelets   Date/Time Value Ref Range Status   02/09/2019 0537 290 150 - 450 10*3/mm3 Final   02/08/2019 1300 222 150 - 450 10*3/mm3 Final     Sodium  Sodium   Date/Time Value Ref Range Status   02/10/2019 0548 134 132 - 146 mmol/L Final   02/09/2019 0728 137 132 - 146 mmol/L Final   02/08/2019 1301 134 132 - 146 mmol/L Final     Potassium  Potassium   Date/Time Value Ref Range Status   02/10/2019 0548 3.9 3.5 - 5.5 mmol/L Final   02/09/2019 0728 5.6 (H) 3.5 - 5.5 mmol/L Final   02/08/2019 1301 3.1 (L) 3.5 - 5.5 mmol/L Final     Chloride  Chloride   Date/Time Value Ref Range Status   02/10/2019 0548 99 99 - 109 mmol/L Final   02/09/2019 0728 109 99 - 109 mmol/L Final   02/08/2019 1301 105 99 - 109 mmol/L Final     BicarbonateNo results found for: PLASMABICARB    BUN BUN   Date/Time Value Ref Range Status   02/10/2019 0548 28 (H) 9 - 23 mg/dL Final   02/09/2019 0728 18 9 - 23 mg/dL Final   02/08/2019 1301 19 9 - 23 mg/dL Final      Creatinine Creatinine   Date/Time Value Ref Range Status   02/10/2019 0548 1.36 (H) 0.60 - 1.30 mg/dL Final   02/09/2019 0728 1.06 0.60 - 1.30 mg/dL Final   02/08/2019 1301 1.14 0.60 - 1.30 mg/dL Final      Calcium Calcium   Date/Time Value Ref Range Status   02/10/2019 0548 9.1 8.7 - 10.4 mg/dL Final   02/09/2019 0728 8.4 (L) 8.7 - 10.4 mg/dL Final   02/08/2019 1301 7.5 (L) 8.7 - 10.4 mg/dL Final      Mag @RESULFAST(MG:3)@        PT/INR:     No results found for: PROTIME, INR   Troponin I:    Lab Results   Component Value Date    TROPONINI 0.475 (H)  02/08/2019    TROPONINI 0.006 02/03/2019    No results found for: CKTOTAL, CKMB, CKMBINDEX, POCRTROPI, TROPONINT      carvedilol 3.125 mg Oral Q12H   doxepin 50 mg Oral Nightly   famotidine 20 mg Oral Daily   heparin (porcine) 5,000 Units Subcutaneous Q8H   ipratropium-albuterol 3 mL Nebulization TID - RT   predniSONE 10 mg Oral Daily          Assessment/Plan     Patient Active Problem List   Diagnosis Code   • Leukoaraiosis I67.81   • LOM (loss of memory) R41.3   • Cognitive impairment R41.89   • Vascular dementia F01.50   • Hypertension I10   • Anxiety F41.9   • High cholesterol E78.00   • Cardiac disorder I51.9   • Migraine G43.909   • Acute respiratory distress R06.03   • Stress-induced cardiomyopathy I51.81   • COPD (chronic obstructive pulmonary disease) (CMS/[facility]) J44.9   • GRACIE (acute kidney injury) (CMS/[facility]) N17.9   EF~35%  Normal cors      Diurese   Continue support  Dr. Gomez to see in DANO Chung  02/10/19  11:58 AM          Electronically signed by Archie Hassan MD at 2/10/2019 10:40 PM     Archie Hassan MD at 2/9/2019 11:51 AM                                                         [facility]       LOS: 1 day   Patient Care Team:  Manju Wilson APRN as PCP - General (Family Medicine)        Subjective       Patient Denies:  Cp, palpitations.  C/o sob      Vital Signs  Temp:  [98 °F (36.7 °C)-98.3 °F (36.8 °C)] 98.2 °F (36.8 °C)  Heart Rate:  [] 69  Resp:  [18-28] 22  BP: ()/() 92/48    Intake/Output Summary (Last 24 hours) at 2/9/2019 1151  Last data filed at 2/9/2019 0700  Gross per 24 hour   Intake --   Output 1250 ml   Net -1250 ml     No intake/output data recorded.    Physical Exam:     General Appearance:    Alert, cooperative, in no acute distress       Neck:   No adenopathy, supple, trachea midline, no thyromegaly, no JVD       Lungs:     Decreased to auscultation,respirations regular, even and                  unlabored     Heart:    Regular rhythm and normal rate, normal S1 and S2, no            murmur, no gallop, no rub, no click   Chest Wall:    No abnormalities observed   Abdomen:     Normal bowel sounds, no masses, no organomegaly, soft        non-tender, non-distended       Extremities:   Moves all extremities well, no edema, no cyanosis, no             redness   Pulses:   Pulses palpable and equal bilaterally     Results Review:     I reviewed the patient's new clinical results.      WBC WBC   Date/Time Value Ref Range Status   02/09/2019 0537 8.49 3.50 - 10.80 10*3/mm3 Final   02/08/2019 1300 5.77 3.50 - 10.80 10*3/mm3 Final            HGB Hemoglobin   Date/Time Value Ref Range Status   02/09/2019 0537 10.2 (L) 11.5 - 15.5 g/dL Final   02/08/2019 1300 8.3 (L) 11.5 - 15.5 g/dL Final           HCT Hematocrit   Date/Time Value Ref Range Status   02/09/2019 0537 34.0 (L) 34.5 - 44.0 % Final   02/08/2019 1300 27.3 (L) 34.5 - 44.0 % Final            Platlets Platelets   Date/Time Value Ref Range Status   02/09/2019 0537 290 150 - 450 10*3/mm3 Final   02/08/2019 1300 222 150 - 450 10*3/mm3 Final     Sodium  Sodium   Date/Time Value Ref Range Status   02/09/2019 0728 137 132 - 146 mmol/L Final   02/08/2019 1301 134 132 - 146 mmol/L Final     Potassium  Potassium   Date/Time Value Ref Range Status   02/09/2019 0728 5.6 (H) 3.5 - 5.5 mmol/L Final   02/08/2019 1301 3.1 (L) 3.5 - 5.5 mmol/L Final     Chloride  Chloride   Date/Time Value Ref Range Status   02/09/2019 0728 109 99 - 109 mmol/L Final   02/08/2019 1301 105 99 - 109 mmol/L Final     BicarbonateNo results found for: PLASMABICARB    BUN BUN   Date/Time Value Ref Range Status   02/09/2019 0728 18 9 - 23 mg/dL Final   02/08/2019 1301 19 9 - 23 mg/dL Final      Creatinine Creatinine   Date/Time Value Ref Range Status   02/09/2019 0728 1.06 0.60 - 1.30 mg/dL Final   02/08/2019 1301 1.14 0.60 - 1.30 mg/dL Final      Calcium Calcium   Date/Time Value Ref Range Status   02/09/2019 0728  8.4 (L) 8.7 - 10.4 mg/dL Final   02/08/2019 1301 7.5 (L) 8.7 - 10.4 mg/dL Final      Mag @RESULFAST(MG:3)@        PT/INR:     No results found for: PROTIME, INR   Troponin I:    Lab Results   Component Value Date    TROPONINI 0.475 (H) 02/08/2019    TROPONINI 0.006 02/03/2019    No results found for: CKTOTAL, CKMB, CKMBINDEX, POCRTROPI, TROPONINT      carvedilol 6.25 mg Oral Q12H   famotidine 20 mg Oral Daily   heparin (porcine) 5,000 Units Subcutaneous Q8H   ipratropium-albuterol 3 mL Nebulization TID - RT   [START ON 2/10/2019] predniSONE 10 mg Oral Daily          Assessment/Plan     Patient Active Problem List   Diagnosis Code   • Leukoaraiosis I67.81   • LOM (loss of memory) R41.3   • Cognitive impairment R41.89   • Vascular dementia F01.50   • Hypertension I10   • Anxiety F41.9   • High cholesterol E78.00   • Cardiac disorder I51.9   • Migraine G43.909   • Acute respiratory distress R06.03   • Stress-induced cardiomyopathy I51.81   • COPD (chronic obstructive pulmonary disease) (CMS/[facility]) J44.9   • GRACIE (acute kidney injury) (CMS/[facility]) N17.9   EF~35%  Normal cors    Decrease beta blocker  Diurese   Continue support    DANO Herman  02/09/19  11:51 AM          Electronically signed by Archie Hassan MD at 2/10/2019  8:23 AM     Yvonne Cummings MD at 2/9/2019 10:41 AM          Critical Care Note     LOS: 1 day   Patient Care Team:  Manju Wilson APRN as PCP - General (Family Medicine)    Chief Complaint/Reason for visit:   Stress-induced cardiomyopathy  Vascular dementia  Hypertension  COPD  Acute renal insufficiency      Subjective     78-year-old woman with underlying COPD, dementia, presenting with worsening dyspnea. She had ST elevation on her EKG and was taken to the Cath Lab. Coronary arteries were patent but her EF was 40-45% with apical ballooning consistent with stress-induced cardiomyopathy.  Interval History:   Complaining of nausea today  Short of air with exertion  No productive  "cough or wheezing  Saturation 95% on 2 L  She takes chronic prednisone at home for psoriatic arthritis    Review of Systems:    All systems were reviewed and negative except as noted in subjective.    Medical history, surgical history, social history, family history reviewed    Objective     Intake/Output:    Intake/Output Summary (Last 24 hours) at 2/9/2019 1042  Last data filed at 2/9/2019 0700  Gross per 24 hour   Intake --   Output 1250 ml   Net -1250 ml       Nutrition:  Diet Regular; Cardiac    Infusions:       Telemetry: Sinus rhythm             Vital Signs  Blood pressure 109/80, pulse 63, temperature 98.2 °F (36.8 °C), temperature source Oral, resp. rate 22, height 157.5 cm (62.01\"), weight 45.4 kg (100 lb 1.4 oz), SpO2 97 %.    Physical Exam:  General Appearance:  Frail elderly woman in no acute distress    Head:  Temporal wasting    Eyes:          No jaundice, conjunctiva pink    Ears:     Throat: Oral mucosa moist    Neck: Trachea midline, no carotid bruit    Back:      Lungs:   Respirations are shallow, bilateral with bibasilar crackles, no wheeze or rhonchi     Heart:  Regular rhythm, S1, S2 auscultated    Abdomen:   Bowel sounds present, soft    Rectal:   Deferred   Extremities: No pitting edema or cyanosis    Pulses: Pedal pulses present    Skin: Warm and dry    Lymph nodes:    Neurologic: Awake and cooperative       Results Review:     I reviewed the patient's new clinical results.   Results from last 7 days   Lab Units 02/09/19  0728 02/08/19  1301 02/03/19  1352   SODIUM mmol/L 137 134 138   POTASSIUM mmol/L 5.6* 3.1* 3.7   CHLORIDE mmol/L 109 105 104   CO2 mmol/L 24.0 26.0 23.0   BUN mg/dL 18 19 22   CREATININE mg/dL 1.06 1.14 1.59*   CALCIUM mg/dL 8.4* 7.5* 8.9   BILIRUBIN mg/dL  --  0.3 0.4   ALK PHOS U/L  --  54 73   ALT (SGPT) U/L  --  12 12   AST (SGOT) U/L  --  22 18   GLUCOSE mg/dL 90 105* 132*     Results from last 7 days   Lab Units 02/09/19  0537 02/08/19  1300 02/03/19  1352   WBC " 10*3/mm3 8.49 5.77 4.89   HEMOGLOBIN g/dL 10.2* 8.3* 9.5*   HEMATOCRIT % 34.0* 27.3* 30.9*   PLATELETS 10*3/mm3 290 222 247         No results found for: BLOODCX  No results found for: URINECX    I reviewed the patient's new imaging including images and reports.  Interpretation Summary     · Left ventricular systolic function is moderately decreased. Estimated EF = 35%.  · Left ventricular wall motion is abnormal. There is hypokinesis of the mid to apical segments with sparing of the basal segments. These findings are consistent with Takotsubo Cardiomyopathy.  · Left ventricular diastolic dysfunction (grade I a) consistent with impaired relaxation.  · Mild tricuspid valve regurgitation is present.        IMPRESSION:  · There is no significant occlusive coronary artery disease.  There were minimal luminal irregularities.  · Low normal ejection fraction of 45-50% with evidence of stress-induced (Takotsubo) cardiomyopathy.  The base is hyperdynamic, the mid to distal and apical segments are hypokinetic.  · There is what appears to be a well-defined healed dissection plane in the proximal segment of the right external iliac artery after the bifurcation with the right internal iliac artery. There are also surgical staples adjacent to this segment.  This finding is possibly consistent with an old surgical or instrumentation injury to the right external iliac artery    Chest x-ray reveals lungs to be mildly hyperinflated. Cardiomegaly is present. No effusion or edema    All medications reviewed.     carvedilol 6.25 mg Oral Q12H   famotidine 20 mg Oral Daily   heparin (porcine) 5,000 Units Subcutaneous Q8H   ipratropium-albuterol 3 mL Nebulization Q6H - RT   predniSONE 20 mg Oral Daily         Assessment/Plan       Stress-induced cardiomyopathy    Vascular dementia    Hypertension    COPD (chronic obstructive pulmonary disease) (CMS/[facility])    GRACIE (acute kidney injury) (CMS/[facility])    78-year-old woman presenting with progressive  dyspnea and ST elevation. She was found to have Takotsubo cardiomyopathy with an EF of 40%. Today her chest x-ray does not reveal acute edema. 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  Her serum creatinine has improved and is currently 1.06, decreased from 1.59  She wears oxygen at home, 2 L. Currently her saturations are 97% on 2 L    PLAN:  Taper prednisone to 10 mg  Continue nebulized bronchodilators  Coreg 6.25 mg twice daily  Monitor electrolytes and replace  Mobilize with assistance  Telemetry    VTE Prophylaxis: subcutaneous heparin    Stress Ulcer Prophylaxis: Elizabeth Cummings MD  02/09/19  10:42 AM      Time: 25min  I personally provided care to this critically ill patient as documented above.  Critical care time does not include time spent on separately billed procedures.  Non of my critical care time was concurrent with other critical care providers.     Electronically signed by Yvonne Cummings MD at 2/9/2019 10:51 AM          Consult Notes (last 7 days) (Notes from 02/04/19 through 02/11/19)      Brian Gomez MD at 2/8/2019  3:07 PM           [facility] AT [address], [address]  [place], [place], [address] [phone]  [email or web address]           INPATIENT CONSULTATION NOTE    Patient Care Team:  Patient Care Team:  Manju Wilson APRN as PCP - General (Family Medicine)    Reason for consultation: STEMI, dyspnea, anginal equivalent  Subjective     HPI:    Olga Shine is a 78 y.o. female.  History of Present Illness     The patient has a history of hypertension, hyperlipidemia, COPD on chronic home O2, psoriatic arthritis, ovarian cancer, colostomy, and dementia.  She presented to [facility] via EMS after worsening shortness of breath. She states her shortness of breath has been progressively worsening since yesterday, to the point that she could not ambulate to the restroom  without significant difficulty. She had had an albuterol nebulizer treatment prior to EMS arrival without any improvement. She was also complaining of epigastric pain at a 10/10.  EMS found her to be tachypnic on O2 @ 2L. She was given an ipratropium nebulizer treatment,  mg, and NTG SL.  With improvement of her pain to 6/10 and respiratory rate. ST elevations were noted in V4, V5, V6.  She was recently treated at [facility] ED clinically for influenza after having exposure and in the setting of shortness of breath and fever. She is a former smoker. She denies alcohol or drug use.     PFSH:  Patient Active Problem List   Diagnosis   • Leukoaraiosis   • LOM (loss of memory)   • Cognitive impairment   • Vascular dementia   • Hypertension   • Anxiety   • High cholesterol   • Cardiac disorder   • Migraine       No current facility-administered medications on file prior to encounter.      Current Outpatient Medications on File Prior to Encounter   Medication Sig Dispense Refill   • atorvastatin (LIPITOR) 80 MG tablet Daily.  4   • cetirizine (ZyrTEC) 10 MG tablet Daily.  5   • doxepin (SINEquan) 25 MG capsule Daily.  5   • ferrous sulfate 325 (65 FE) MG tablet Take 325 mg by mouth Daily With Breakfast.     • folic acid (FOLVITE) 1 MG tablet Daily.  5   • ipratropium-albuterol (DUO-NEB) 0.5-2.5 mg/3 ml nebulizer Take 3 mL by nebulization 2 (Two) Times a Day As Needed for Wheezing.     • isosorbide mononitrate (IMDUR) 30 MG 24 hr tablet Daily.  4   • Multiple Vitamins-Minerals (PRESERVISION AREDS 2) capsule Take  by mouth 2 (Two) Times a Day.     • oseltamivir (TAMIFLU) 30 MG capsule Take 1 capsule by mouth Every 12 (Twelve) Hours. 10 capsule 0   • predniSONE (DELTASONE) 20 MG tablet Take 1 tablet by mouth 2 (Two) Times a Day. 8 tablet 0   • predniSONE (DELTASONE) 5 MG tablet Daily.  3   • ranitidine (ZANTAC) 300 MG tablet Daily.  5   • triamterene-hydrochlorothiazide (MAXZIDE-25) 37.5-25 MG per tablet Daily.  4      Allergies   Allergen Reactions   • Advair Diskus [Fluticasone-Salmeterol]    • Atenolol    • Ciprofloxacin    • Clindamycin/Lincomycin      choke   • Doxazosin    • Halobetasol    • Hydrocodone    • Penicillins    • Percocet [Oxycodone-Acetaminophen]    • Sulfa Antibiotics    • Xanax [Alprazolam]        Social History     Socioeconomic History   • Marital status:      Spouse name: Not on file   • Number of children: Not on file   • Years of education: Not on file   • Highest education level: Not on file   Tobacco Use   • Smoking status: Former Smoker     Types: Cigarettes     Last attempt to quit: 10/20/2014     Years since quittin.3   Substance and Sexual Activity   • Alcohol use: No   • Drug use: No   • Sexual activity: Defer     Family History   Problem Relation Age of Onset   • Dementia Mother    • Stroke Sister    • Parkinsonism Sister    • Alcohol abuse Other         Uncle   • Alzheimer's disease Other         Uncle   • Stroke Father        Review of Systems:  Positive for shortness of breath, epigastric pain  All other systems reviewed are negative.    Objective     Objective:     Vital Sign Min/Max for last 24 hours  No Data Recorded   BP  Min: 130/74  Max: 145/96   Pulse  Min: 91  Max: 103   Resp  Min: 18  Max: 18   SpO2  Min: 94 %  Max: 99 %   No Data Recorded    No intake or output data in the 24 hours ending 19 1507        Vitals:    19 1458   BP: 130/74   Pulse: 91   Resp: 18   SpO2: 99%       CONSTITUTIONAL: Well-nourished. In no acute distress.   SKIN: Warm and dry. No rashes noted  HEENT: Head is normocephalic and atraumatic. Mucous membranes are pink and moist.   NECK: Supple without masses or thyromegaly.   LUNGS: Normal effort. Diminished breath sounds, rhonchi present.  CARDIOVASCULAR: The heart has a tachycardic rate with a normal S1 and S2. There is no murmur, gallop, rub, or click appreciated.   PERIPHERAL VASCULAR: Carotid upstroke is 2+ bilaterally and without  bruits. Radial pulses are 2+ bilaterally. Dorsalis pedis pulses are 2+ and symmetrical. There is no lower extremity edema.   ABDOMEN: Soft with no tenderness with palpitation. No hepatosplenomegaly  MUSCULOSKELETAL:  No digital cyanosis  NEUROLOGICAL: Nonfocal.  PSYCHIATRIC: Alert, orientated x 3, appropriate affect     Labs:  Lab Results   Component Value Date    TROPONINI 0.006 02/03/2019       No results found for: GLUCOSE, BUN, CREATININE, NA, K, CL, CO2, CALCIUM, PROTEINTOT, ALBUMIN, ALT, AST, ALKPHOS, BILITOT, EGFRIFNONA, LABIL2, BCR, ANIONGAP    No results found for: CHOL  No results found for: TRIG  No results found for: HDL  No results found for: LDL  No components found for: LDLDIRECTC      No results found for: WBC, RBC, HGB, HCT, MCV, MCH, MCHC, RDW, RDWSD, MPV, PLT, NEUTRORELPCT, LYMPHORELPCT, MONORELPCT, EOSRELPCT, BASORELPCT, AUTOIGPER, NEUTROABS, LYMPHSABS, MONOSABS, EOSABS, BASOSABS, AUTOIGNUM, NRBC      Diagnostic Data:    EKG EMS:  Sinus tachycardia, St elevation in V4, V5, V6, left anterior fascicular block    Tele:  Sinus tachycardia    TTE 1/2017  -Normal LV systolic function and wall motion. LVEF estimated at 55-60%  -Diastolic Dysfunction  -The mitral valve leaflets appear normal. No prolapse or stenosis. Trace MR.  -The aortic valve is trileaflet. There is no aortic stenosis or insufficiency.  -The tricuspid valve appears structurally normal. There is mild tricuspid regurgitation.    [facility] 3/2014  a. Normal coronary arteries and normal LV systolic function.  Elevated troponin felt secondary to coronary artery spasm.      Assessment     Assessment and Plan:   ASSESSMENT:  -STEMI, elevation noted in V4, V5, V6, Previously followed with Dr. Slaughter as an outpatient.   -Left anterior fascicular block  -Essential Hypertension  -Hyperlipidemia  -Possible Dementia    PLAN:  -After an assessment of patient and a review of her clinical history, it was deemed necessary to proceed with emergent  cardiac catheterization. The risks, benefits, and alternatives of the procedure have been reviewed and the patient wishes to proceed.   -CBC, CMP, BNP, Troponin, HgB A1C, D-Dimer, and lipid panel.    Scribed for Dr. Gomez by ASHUTOSH Martin. 2/8/2019  3:12 PM    Electronically signed by ASHUTOSH Martin, 02/08/19, 3:12 PM.    I, Brian Gomez MD, personally performed the services as scribed by the above named individual. I have made any necessary edits and it is both accurate and complete.     Brian Gomez MD, MSc, [facility]  Interventional Cardiology  [facility]      ADDENDUM  -Heart catheterization revealed no obstructive coronary artery disease. Left ventriculogram revealed an EF of 45-50% with evidence of Takotsubo cardiomyopathy.  -Admission to the ICU for respiratory evaluation/treatment and medical management of Takotsubo cardiomyopathy  -Transthoracic echocardiogram.  -Begin carvedilol 6.25 mg po BID. If BP tolerating beta blocker overnight would consider adding ACE/ARB tomorrow. She has previously been intolerant to atenolol due to dizziness. Holding off home Maxzide.    -Also, patient found to have an incidental old appearing, chronic dissection plane in the right external iliac artery  -Lower extermity arterial duplex ordered. Asked to also have visualization of the right external iliac artery    Brian Gomez MD, MSc, [facility]  Interventional Cardiology  [facility]        Electronically signed by Brian Gomez MD at 2/8/2019  4:31 PM

## 2019-02-11 NOTE — PLAN OF CARE
"Problem: Skin Injury Risk (Adult)  Goal: Identify Related Risk Factors and Signs and Symptoms  Outcome: Ongoing (interventions implemented as appropriate)    Goal: Skin Health and Integrity  Outcome: Ongoing (interventions implemented as appropriate)      Problem: Patient Care Overview  Goal: Plan of Care Review  Pt had what appeared to be COPD exacerbation(see nursing note) after neb treatment back to baseline. VSS, pt is confused and anxious at times. Pt was in obvious resp distress, wheezing tachypenic, sats did NOT fall. , pt anxiety make it harder to control symptoms pt \"states im afraid Im going to have another heart attack\"  may benefit for anti-anxiety meds.     Problem: Confusion, Acute (Adult)  Goal: Identify Related Risk Factors and Signs and Symptoms  Outcome: Ongoing (interventions implemented as appropriate)      Problem: Breathing Pattern Ineffective (Adult)  Goal: Identify Related Risk Factors and Signs and Symptoms  Outcome: Ongoing (interventions implemented as appropriate)    Goal: Effective Oxygenation/Ventilation  Outcome: Ongoing (interventions implemented as appropriate)    Goal: Anxiety/Fear Reduction  Outcome: Ongoing (interventions implemented as appropriate)      Problem: Fall Risk (Adult)  Goal: Identify Related Risk Factors and Signs and Symptoms  Outcome: Ongoing (interventions implemented as appropriate)    Goal: Absence of Fall  Outcome: Ongoing (interventions implemented as appropriate)  BED alarm on       "

## 2019-02-11 NOTE — PROGRESS NOTES
Discharge Planning Assessment  [facility]     Patient Name: Olga Shine  MRN: [id number]  Today's Date: 2/11/2019    Admit Date: 2/8/2019    Discharge Needs Assessment     Row Name 02/11/19 1200       Living Environment    Lives With  grandchild(flavio)    Current Living Arrangements  home/apartment/condo    Primary Care Provided by  self    Provides Primary Care For  no one    Family Caregiver if Needed  grandchild(flavio), adult    Quality of Family Relationships  helpful;involved;supportive    Able to Return to Prior Arrangements  yes       Resource/Environmental Concerns    Resource/Environmental Concerns  none    Transportation Concerns  car, none       Transition Planning    Patient/Family Anticipates Transition to  home;home with family    Patient/Family Anticipated Services at Transition  none    Transportation Anticipated  family or friend will provide       Discharge Needs Assessment    Readmission Within the Last 30 Days  no previous admission in last 30 days    Concerns to be Addressed  no discharge needs identified;denies needs/concerns at this time    Equipment Currently Used at Home  nebulizer;rollator    Anticipated Changes Related to Illness  none    Equipment Needed After Discharge  none        Discharge Plan     Row Name 02/11/19 1201       Plan    Plan  Home at discharge     Patient/Family in Agreement with Plan  yes    Plan Comments  Spoke with patient and grand-daughter at bedside - Patient states she lives with her grand-daughter in [facility] [place] [facility] and has a rollator along with a nebulizer at home. She denies any other needs for DME at home and is not interested in home health for PT or outpatient PT. Grand-daughter at bedside is comfortable with taking patient home and denies any needs or concerns as well - Grand-daughter will transport home - Rose Marie [phone]     Final Discharge Disposition Code  01 - home or self-care        Destination      No service coordination in this encounter.       Durable Medical Equipment      No service coordination in this encounter.      Dialysis/Infusion      No service coordination in this encounter.      Home Medical Care      No service coordination in this encounter.      Community Resources      No service coordination in this encounter.        Expected Discharge Date and Time     Expected Discharge Date Expected Discharge Time    Feb 11, 2019         Demographic Summary     Row Name 02/11/19 1158       General Information    Admission Type  inpatient    Arrived From  home    Referral Source  admission list    Reason for Consult  discharge planning    Preferred Language  English     Used During This Interaction  no       Contact Information    Permission Granted to Share Info With          Functional Status     Row Name 02/11/19 1200       Functional Status    Usual Activity Tolerance  good    Current Activity Tolerance  moderate       Functional Status, IADL    Medications  independent    Meal Preparation  assistive person    Housekeeping  assistive equipment and person    Laundry  assistive equipment and person    Shopping  assistive equipment and person    IADL Comments  Uses Rollator and grand-daughter assists        Mental Status Summary    Recent Changes in Mental Status/Cognitive Functioning  no changes        Psychosocial    No documentation.       Abuse/Neglect    No documentation.       Legal    No documentation.       Substance Abuse    No documentation.       Patient Forms    No documentation.           Rose Marie Tolliver RN

## 2019-02-11 NOTE — PLAN OF CARE
Problem: Skin Injury Risk (Adult)  Goal: Identify Related Risk Factors and Signs and Symptoms  Outcome: Outcome(s) achieved Date Met: 02/11/19    Goal: Skin Health and Integrity  Outcome: Outcome(s) achieved Date Met: 02/11/19      Problem: Patient Care Overview  Goal: Plan of Care Review  Outcome: Outcome(s) achieved Date Met: 02/11/19 02/11/19 1126   Coping/Psychosocial   Plan of Care Reviewed With caregiver   OTHER   Outcome Summary pt. being discharged home today     Goal: Individualization and Mutuality  Outcome: Outcome(s) achieved Date Met: 02/11/19    Goal: Discharge Needs Assessment  Outcome: Outcome(s) achieved Date Met: 02/11/19    Goal: Interprofessional Rounds/Family Conf  Outcome: Outcome(s) achieved Date Met: 02/11/19      Problem: Confusion, Acute (Adult)  Goal: Identify Related Risk Factors and Signs and Symptoms  Outcome: Outcome(s) achieved Date Met: 02/11/19    Goal: Cognitive/Functional Impairments Minimized  Outcome: Outcome(s) achieved Date Met: 02/11/19    Goal: Safety  Outcome: Outcome(s) achieved Date Met: 02/11/19      Problem: Breathing Pattern Ineffective (Adult)  Goal: Identify Related Risk Factors and Signs and Symptoms  Outcome: Outcome(s) achieved Date Met: 02/11/19    Goal: Effective Oxygenation/Ventilation  Outcome: Outcome(s) achieved Date Met: 02/11/19    Goal: Anxiety/Fear Reduction  Outcome: Outcome(s) achieved Date Met: 02/11/19      Problem: Fall Risk (Adult)  Goal: Identify Related Risk Factors and Signs and Symptoms  Outcome: Outcome(s) achieved Date Met: 02/11/19    Goal: Absence of Fall  Outcome: Outcome(s) achieved Date Met: 02/11/19

## 2019-02-11 NOTE — PROGRESS NOTES
" [facility] at [facility] - discharge Note    Olga Shine  1940  [id number]    02/11/19, 8:59 AM    Chief Complaint: Following for elevated troponin, Takotsubo CM, Tachycardia    Subjective:   Sitting up in bed.  Somewhat anxious.  There is concern that she will have persistent anxiety on discharge to home.  Currently on 2 L as she has home.  She is currently on 5 mg of prednisone at home for her COPD.    Review of Systems:  Pertinent positives are listed above and in physical exam.  All others have been reviewed and are negative.    Current Hospital Meds:   carvedilol 3.125 mg Oral Q12H   doxepin 50 mg Oral Nightly   famotidine 20 mg Oral Daily   guaiFENesin 600 mg Oral Q12H   heparin (porcine) 5,000 Units Subcutaneous Q8H   ipratropium-albuterol 3 mL Nebulization TID - RT   predniSONE 10 mg Oral Daily       Objective:  Vitals:   height is 157.5 cm (62.01\") and weight is 42.7 kg (94 lb 2.2 oz). Her oral temperature is 98.6 °F (37 °C). Her blood pressure is 93/68 and her pulse is 88. Her respiration is 24 and oxygen saturation is 97%.     Intake/Output Summary (Last 24 hours) at 2/11/2019 0859  Last data filed at 2/10/2019 2025  Gross per 24 hour   Intake 240 ml   Output 725 ml   Net -485 ml       Physical Exam:  General: Alert and oriented  Neck: Jugular venous pressure is within normal limits. Carotids have normal upstrokes without bruits.   Cardiovascular: Heart has a nondisplaced focal PMI. Regular rate and rhythm without murmur, gallop or rub.  Lungs: Bilateral expiratory wheezes and rhonchi are heard.  Extremities: Show no edema.  Skin: warm and dry.  Neurologic: nonfocal             Results from last 7 days   Lab Units 02/09/19  0537   WBC 10*3/mm3 8.49   HEMOGLOBIN g/dL 10.2*   HEMATOCRIT % 34.0*   PLATELETS 10*3/mm3 290     Results from last 7 days   Lab Units 02/10/19  0548  02/08/19  1301   SODIUM mmol/L 134   < > 134   POTASSIUM mmol/L 3.9   < > 3.1*   CHLORIDE mmol/L 99   < > 105   CO2 " mmol/L 28.0   < > 26.0   BUN mg/dL 28*   < > 19   CREATININE mg/dL 1.36*   < > 1.14   CALCIUM mg/dL 9.1   < > 7.5*   BILIRUBIN mg/dL  --   --  0.3   ALK PHOS U/L  --   --  54   ALT (SGPT) U/L  --   --  12   AST (SGOT) U/L  --   --  22   GLUCOSE mg/dL 87   < > 105*    < > = values in this interval not displayed.             Results from last 7 days   Lab Units 02/08/19  1301   CHOLESTEROL mg/dL 113   TRIGLYCERIDES mg/dL 113   HDL CHOL mg/dL 40   LDL CHOL mg/dL 50           Tele:  NSR    Assessment and Plan:  1.  Elevated Troponin      - Heart catheterization revealed no obstructive coronary artery disease. Left ventriculogram revealed an EF of 45-50% with evidence of Takotsubo cardiomyopathy.    -  TTE 2/8/19, Dr. Gomez:  EF 35%, hypokinesis c/w Takotsubo CM, grade Ia diastolic dysfunction, mild TR.    -  Currently on Coreg only secondary to hypotensive BP readings.  It is being held       2.  Sinus Tachycardia    -  Not receiving Coreg due to hypotension   -  Continue to monitor.    3. COPD with exacerbation   -  Leukocytosis resolved   -  Per MDs.  4.  GRACIE   -  Labs personally reviewed   -  Monitor volume intake/output    5.  Dementia   -  Some confusion noted during exam this morning   -  Per Hospitalists, PCP.      DANO Kimball  02/11/19, 8:59 AM    Discharge home today is okay with me.  I will place her on hydroxyzine 10 mg q 6 prn due to fears of anxiety.  Her discharge medications will be doxepin 50 mg by mouth daily at bedtime, Pepcid 20 mg by mouth daily, prednisone 5 mg by mouth daily as a home and do a neb when necessary as well as Mucinex 12 hours 600 mg q 12.  She can continue her Tylenol, DOS, MiraLAX, tramadol, atorvastatin, Zyrtec, ferrous sulfate, folic acid, PreserVision, Zantac.    No need for Maxide at discharge.  No need for isosorbide  Follow-up with me in [place] in one month  The patient is stable, appropriate for discharge home, and follow-up has been arranged.    MAXX Whittaker  Sukhjinder CISSE personally performed the services described in this documentation as scribed by the above individual in my presence, and it is both accurate and complete.    Remedios Slaughter MD, FACC

## 2019-02-11 NOTE — NURSING NOTE
At beginning of shift pt was breathing comfortably on 2L O2, slightly labored when up to bedside commode, a couple of hours later pt began to c/o SOA, significant wheezing,  became very restless, tachypenic, labored breathing at rest, resp called for prn neb. NP ordered continuous neb, abg, cxr, both were normal.   Patient appeared  very obviously in resp distress, although sats did NOT fall below 92% on 2L , Nebs made pt have tingling in hands and feet and made her very fidgety and anxious, but after about an hour pt did get relief from it and back to baseline.. NP did suggest probable  COPD exacerbation given pt history.

## 2019-02-12 ENCOUNTER — READMISSION MANAGEMENT (OUTPATIENT)
Dept: CALL CENTER | Facility: HOSPITAL | Age: 79
End: 2019-02-12

## 2019-02-12 NOTE — PAYOR COMM NOTE
"Id # [id number]  Krissy Donahue RN, BSN  Phone # [phone]  Fax # [phone]  Kishor Shine (78 y.o. Female)     Date of Birth Social Security Number Address Home Phone MRN    1940  [address] [place] [address] [phone] [id number]    Yazidism Marital Status          None        Admission Date Admission Type Admitting Provider Attending Provider Department, Room/Bed    2/8/19 Urgent Brian Gomez MD  [facility] 6A, N617/1    Discharge Date Discharge Disposition Discharge Destination        2/11/2019 Home or Self Care              Attending Provider:  (none)   Allergies:  Atenolol, Clindamycin/lincomycin, Doxazosin, Fluticasone-salmeterol, Halobetasol, Hydrocodone, Penicillins, Percocet [Oxycodone-acetaminophen], Sulfa Antibiotics, Xanax [Alprazolam], Ciprofloxacin, Halobetasol Propionate, Pneumococcal Vaccines    Isolation:  None   Infection:  None   Code Status:  Prior    Ht:  157.5 cm (62.01\")   Wt:  42.7 kg (94 lb 2.2 oz)    Admission Cmt:  None   Principal Problem:  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]                 Active Insurance as of 2/8/2019     Primary Coverage     Payor Plan Insurance Group Employer/Plan Group    [employer] MEDICAID [employer] [id number]     Payor Plan Address Payor Plan Phone Number Payor Plan Fax Number Effective Dates    PO  [phone]  2/3/2019 - None Entered    [facility] [id number]       Subscriber Name Subscriber Birth Date Member ID       KISHOR SHINE 1940 [id number]                 Emergency Contacts      (Rel.) Home Phone Work Phone Mobile Phone    JEANNETTE RUIZ (Grandchild) -- -- [phone]             Discharge Order (From admission, onward)    Start     Ordered    02/11/19 1110  Discharge patient  Once     Expected Discharge Date:  02/11/19    Expected Discharge Time:  Midday    Discharge Disposition:  Home or Self Care    Physician of Record for Attribution - Please select from Treatment " Team:  TAMARA VASQUES [[id number]]    Review needed by CMO to determine Physician of Record:  No       Question Answer Comment   Physician of Record for Attribution - Please select from Treatment Team TAMARA VASQUES    Review needed by CMO to determine Physician of Record No        02/11/19 1112        Rose Marie Tolliver, RN   Registered Nurse   Case Management   Progress Notes   Signed   Date of Service:  2/11/2019 12:03 PM               Signed                 Show:Clear all  []Manual[x]Template[]Copied    Added by:  [x]Rose Marie Tolliver, RN      []Julio C for details      Discharge Planning Assessment  [facility]     Patient Name: Olga Shine                    MRN: [id number]  Today's Date: 2/11/2019                     Admit Date: 2/8/2019          Discharge Needs Assessment      Row Name 02/11/19 1200           Living Environment     Lives With  grandchild(flavio)     Current Living Arrangements  home/apartment/condo     Primary Care Provided by  self     Provides Primary Care For  no one     Family Caregiver if Needed  grandchild(flavio), adult     Quality of Family Relationships  helpful;involved;supportive     Able to Return to Prior Arrangements  yes           Resource/Environmental Concerns     Resource/Environmental Concerns  none     Transportation Concerns  car, none           Transition Planning     Patient/Family Anticipates Transition to  home;home with family     Patient/Family Anticipated Services at Transition  none     Transportation Anticipated  family or friend will provide           Discharge Needs Assessment     Readmission Within the Last 30 Days  no previous admission in last 30 days     Concerns to be Addressed  no discharge needs identified;denies needs/concerns at this time     Equipment Currently Used at Home  nebulizer;rollator     Anticipated Changes Related to Illness  none     Equipment Needed After Discharge  none               Discharge Plan      Row Name 02/11/19 120            Plan     Plan  Home at discharge      Patient/Family in Agreement with Plan  yes     Plan Comments  Spoke with patient and grand-daughter at bedside - Patient states she lives with her grand-daughter in [place] [facility] and has a rollator along with a nebulizer at home. She denies any other needs for DME at home and is not interested in home health for PT or outpatient PT. Grand-daughter at bedside is comfortable with taking patient home and denies any needs or concerns as well - Grand-daughter will transport home - Rose Marie [phone]      Final Discharge Disposition Code  01 - home or self-care              Destination       No service coordination in this encounter.               Durable Medical Equipment       No service coordination in this encounter.               Dialysis/Infusion       No service coordination in this encounter.               Home Medical Care       No service coordination in this encounter.               Community Resources       No service coordination in this encounter.               Expected Discharge Date and Time      Expected Discharge Date Expected Discharge Time     Feb 11, 2019                 Demographic Summary      Row Name 02/11/19 1158           General Information     Admission Type  inpatient     Arrived From  home     Referral Source  admission list     Reason for Consult  discharge planning     Preferred Language  English      Used During This Interaction  no           Contact Information     Permission Granted to Share Info With                 Functional Status      Row Name 02/11/19 1200           Functional Status     Usual Activity Tolerance  good     Current Activity Tolerance  moderate           Functional Status, IADL     Medications  independent     Meal Preparation  assistive person     Housekeeping  assistive equipment and person     Laundry  assistive equipment and person     Shopping  assistive equipment and person     IADL Comments  Uses  "Rollator and grand-daughter assists            Mental Status Summary     Recent Changes in Mental Status/Cognitive Functioning  no changes          Psychosocial    No documentation.         Abuse/Neglect    No documentation.         Legal    No documentation.         Substance Abuse    No documentation.         Patient Forms    No documentation.               GENNA Hendrickson Paula W, MD   Physician   Cardiology   Progress Notes   Signed   Date of Service:  2/11/2019  8:59 AM               Signed                 Show:Clear all  [x]Manual[x]Template[x]Copied    Added by:  [x]Carolina Ellis PA[x]Remedios Slaughter MD      []Julio C for details                  [facility] - discharge Note     Olga Shine  1940  N617/1     02/11/19, 8:59 AM     Chief Complaint: Following for elevated troponin, Takotsubo CM, Tachycardia     Subjective:   Sitting up in bed.  Somewhat anxious.  There is concern that she will have persistent anxiety on discharge to home.  Currently on 2 L as she has home.  She is currently on 5 mg of prednisone at home for her COPD.     Review of Systems:  Pertinent positives are listed above and in physical exam.  All others have been reviewed and are negative.     Current Hospital Meds:   carvedilol 3.125 mg Oral Q12H   doxepin 50 mg Oral Nightly   famotidine 20 mg Oral Daily   guaiFENesin 600 mg Oral Q12H   heparin (porcine) 5,000 Units Subcutaneous Q8H   ipratropium-albuterol 3 mL Nebulization TID - RT   predniSONE 10 mg Oral Daily         Objective:  Vitals:   height is 157.5 cm (62.01\") and weight is 42.7 kg (94 lb 2.2 oz). Her oral temperature is 98.6 °F (37 °C). Her blood pressure is 93/68 and her pulse is 88. Her respiration is 24 and oxygen saturation is 97%.     Intake/Output Summary (Last 24 hours) at 2/11/2019 0859  Last data filed at 2/10/2019 2025      Gross per 24 hour   Intake 240 ml   Output 725 ml "   Net -485 ml         Physical Exam:  General: Alert and oriented  Neck: Jugular venous pressure is within normal limits. Carotids have normal upstrokes without bruits.   Cardiovascular: Heart has a nondisplaced focal PMI. Regular rate and rhythm without murmur, gallop or rub.  Lungs: Bilateral expiratory wheezes and rhonchi are heard.  Extremities: Show no edema.  Skin: warm and dry.  Neurologic: nonfocal                                          Results from last 7 days   Lab Units 02/09/19  0537   WBC 10*3/mm3 8.49   HEMOGLOBIN g/dL 10.2*   HEMATOCRIT % 34.0*   PLATELETS 10*3/mm3 290             Results from last 7 days   Lab Units 02/10/19  0548   02/08/19  1301   SODIUM mmol/L 134   < > 134   POTASSIUM mmol/L 3.9   < > 3.1*   CHLORIDE mmol/L 99   < > 105   CO2 mmol/L 28.0   < > 26.0   BUN mg/dL 28*   < > 19   CREATININE mg/dL 1.36*   < > 1.14   CALCIUM mg/dL 9.1   < > 7.5*   BILIRUBIN mg/dL  --   --  0.3   ALK PHOS U/L  --   --  54   ALT (SGPT) U/L  --   --  12   AST (SGOT) U/L  --   --  22   GLUCOSE mg/dL 87   < > 105*    < > = values in this interval not displayed.                   Results from last 7 days   Lab Units 02/08/19  1301   CHOLESTEROL mg/dL 113   TRIGLYCERIDES mg/dL 113   HDL CHOL mg/dL 40   LDL CHOL mg/dL 50             Tele:  NSR     Assessment and Plan:  1.  Elevated Troponin                                      - Heart catheterization revealed no obstructive coronary artery disease. Left ventriculogram revealed an EF of 45-50% with evidence of Takotsubo cardiomyopathy.              -  TTE 2/8/19, Dr. Gomez:  EF 35%, hypokinesis c/w Takotsubo CM, grade Ia diastolic dysfunction, mild TR.              -  Currently on Coreg only secondary to hypotensive BP readings.  It is being held         2.  Sinus Tachycardia              -  Not receiving Coreg due to hypotension              -  Continue to monitor.     3. COPD with exacerbation              -  Leukocytosis resolved              -  Per MDs.  4.   GRACIE              -  Labs personally reviewed              -  Monitor volume intake/output     5.  Dementia              -  Some confusion noted during exam this morning              -  Per Hospitalists, PCP.        DANO Kimball  02/11/19, 8:59 AM     Discharge home today is okay with me.  I will place her on hydroxyzine 10 mg q 6 prn due to fears of anxiety.  Her discharge medications will be doxepin 50 mg by mouth daily at bedtime, Pepcid 20 mg by mouth daily, prednisone 5 mg by mouth daily as a home and do a neb when necessary as well as Mucinex 12 hours 600 mg q 12.  She can continue her Tylenol, DOS, MiraLAX, tramadol, atorvastatin, Zyrtec, ferrous sulfate, folic acid, PreserVision, Zantac.    No need for Maxide at discharge.  No need for isosorbide  Follow-up with me in [place] in one month  The patient is stable, appropriate for discharge home, and follow-up has been arranged.     I Remedios Slaughter MD personally performed the services described in this documentation as scribed by the above individual in my presence, and it is both accurate and complete.     Remedios Slaughter MD, FACC

## 2019-02-13 ENCOUNTER — READMISSION MANAGEMENT (OUTPATIENT)
Dept: CALL CENTER | Facility: HOSPITAL | Age: 79
End: 2019-02-13

## 2019-02-13 NOTE — OUTREACH NOTE
Medical Week 1 Survey      Responses   Facility patient discharged from?  [facility]   Does the patient have one of the following disease processes/diagnoses(primary or secondary)?  Other   Is there a successful TCM telephone encounter documented?  No   Week 1 attempt successful?  No   Unsuccessful attempts  Attempt 1          Katia Jaeger RN

## 2019-02-14 ENCOUNTER — READMISSION MANAGEMENT (OUTPATIENT)
Dept: CALL CENTER | Facility: HOSPITAL | Age: 79
End: 2019-02-14

## 2019-02-14 NOTE — OUTREACH NOTE
Medical Week 1 Survey      Responses   Facility patient discharged from?  [facility]   Does the patient have one of the following disease processes/diagnoses(primary or secondary)?  Other   Is there a successful TCM telephone encounter documented?  No   Week 1 attempt successful?  No   Unsuccessful attempts  Attempt 2          Jessi Liao RN

## 2019-02-19 ENCOUNTER — DOCUMENTATION (OUTPATIENT)
Dept: CARDIAC REHAB | Facility: HOSPITAL | Age: 79
End: 2019-02-19

## 2019-02-19 ENCOUNTER — READMISSION MANAGEMENT (OUTPATIENT)
Dept: CALL CENTER | Facility: HOSPITAL | Age: 79
End: 2019-02-19

## 2019-02-19 NOTE — OUTREACH NOTE
Medical Week 2 Survey      Responses   Facility patient discharged from?  [facility]   Does the patient have one of the following disease processes/diagnoses(primary or secondary)?  Other   Week 2 attempt successful?  No   Unsuccessful attempts  Attempt 1          Ton Peralta RN

## 2019-02-21 ENCOUNTER — READMISSION MANAGEMENT (OUTPATIENT)
Dept: CALL CENTER | Facility: HOSPITAL | Age: 79
End: 2019-02-21

## 2019-02-21 NOTE — OUTREACH NOTE
Medical Week 2 Survey      Responses   Facility patient discharged from?  [place]   Does the patient have one of the following disease processes/diagnoses(primary or secondary)?  Other   Week 2 attempt successful?  No   Unsuccessful attempts  Attempt 2          Roxanna Dumont RN

## 2019-02-22 ENCOUNTER — OFFICE VISIT (OUTPATIENT)
Dept: CARDIOLOGY | Facility: HOSPITAL | Age: 79
End: 2019-02-22

## 2019-02-22 VITALS
OXYGEN SATURATION: 96 % | BODY MASS INDEX: 18.95 KG/M2 | HEIGHT: 62 IN | SYSTOLIC BLOOD PRESSURE: 149 MMHG | DIASTOLIC BLOOD PRESSURE: 79 MMHG | TEMPERATURE: 97.7 F | WEIGHT: 103 LBS | HEART RATE: 91 BPM | RESPIRATION RATE: 22 BRPM

## 2019-02-22 DIAGNOSIS — J44.9 CHRONIC OBSTRUCTIVE PULMONARY DISEASE, UNSPECIFIED COPD TYPE (HCC): ICD-10-CM

## 2019-02-22 DIAGNOSIS — I51.81 STRESS-INDUCED CARDIOMYOPATHY: Primary | ICD-10-CM

## 2019-02-22 PROCEDURE — 99214 OFFICE O/P EST MOD 30 MIN: CPT | Performed by: NURSE PRACTITIONER

## 2019-02-22 RX ORDER — CITALOPRAM 10 MG/1
10 TABLET ORAL DAILY
Qty: 30 TABLET | Refills: 1 | Status: SHIPPED | OUTPATIENT
Start: 2019-02-22

## 2019-02-22 RX ORDER — CARVEDILOL 3.12 MG/1
3.12 TABLET ORAL 2 TIMES DAILY
Qty: 60 TABLET | Refills: 2 | Status: SHIPPED | OUTPATIENT
Start: 2019-02-22

## 2019-02-22 RX ORDER — TRIAMTERENE AND HYDROCHLOROTHIAZIDE 37.5; 25 MG/1; MG/1
1 TABLET ORAL EVERY OTHER DAY
Qty: 15 TABLET | Refills: 1 | Status: SHIPPED | OUTPATIENT
Start: 2019-02-22

## 2019-02-22 NOTE — PROGRESS NOTES
"  Subjective:     Encounter Date:2019      Patient ID: Olga Shine is a 78 y.o. female.    Chief Complaint: SOA  History of Present Illness:  Ms. Shine [facility] today after she called requesting to be seen.      Patient was admitted to [facility] 19-19 for STEMI.  She underwent cardiac cath and was found to have Takotsubo cardiomyopathy with EF of 40%.  She responded well to diuresis and was discharged home with meds adjusted but did not tolerate coreg due to hypotension.     Today,  she is accompanied by her granddaughter (living w/ granddaughter) who states patient seems to getting back into frequent c/o \"smothering\" that seems a little different than her COPD but also that she seems very anxious,too.  Her ankles have become swollen again.  She is not weighing herself daily.  She is staying in bed many hours throughout the day an not walking around much.    Past Medical History:   Diagnosis Date   • Anxiety    • Cardiac disorder    • COPD (chronic obstructive pulmonary disease) (CMS/HCC)    • Family history of cerebrovascular accident    • High cholesterol    • History of malignant neoplasm    • Hypertension    • Migraine    • Schizophrenia, paranoid type (CMS/HCC)     reported by alexis 2/10/2019       Past Surgical History:   Procedure Laterality Date   • CARDIAC CATHETERIZATION N/A 2019    Procedure: Left Heart Cath;  Surgeon: Brian Gomez MD;  Location: [facility] INVASIVE LOCATION;  Service: Cardiovascular   • CATARACT EXTRACTION      both eyes   •  SECTION      x2   • HYSTERECTOMY     • OTHER SURGICAL HISTORY      ostomy   • TONSILLECTOMY      1 tonsil       Social History     Socioeconomic History   • Marital status:      Spouse name: Not on file   • Number of children: Not on file   • Years of education: Not on file   • Highest education level: Not on file   Social Needs   • Financial resource strain: Not on file   • Food insecurity - " worry: Not on file   • Food insecurity - inability: Not on file   • Transportation needs - medical: Not on file   • Transportation needs - non-medical: Not on file   Occupational History   • Not on file   Tobacco Use   • Smoking status: Former Smoker     Packs/day: 1.00     Years: 10.00     Pack years: 10.00     Types: Cigarettes     Last attempt to quit: 10/20/2014     Years since quittin.3   • Smokeless tobacco: Never Used   Substance and Sexual Activity   • Alcohol use: No   • Drug use: No   • Sexual activity: Defer   Other Topics Concern   • Not on file   Social History Narrative    Caffeine Intake: 0-2  servings per day       Family History   Problem Relation Age of Onset   • Dementia Mother    • Stroke Sister    • Parkinsonism Sister    • Alcohol abuse Other         Uncle   • Alzheimer's disease Other         Uncle   • Stroke Father        Review of Systems   Constitution: Positive for weakness, malaise/fatigue and weight gain (10 lbs since sep). Negative for chills, decreased appetite, diaphoresis, fever, night sweats and weight loss.   HENT: Positive for congestion. Negative for hearing loss, hoarse voice and nosebleeds.    Eyes: Negative for blurred vision, visual disturbance and visual halos.   Cardiovascular: Positive for chest pain, dyspnea on exertion, irregular heartbeat and near-syncope. Negative for claudication, cyanosis, leg swelling, orthopnea, palpitations, paroxysmal nocturnal dyspnea and syncope.   Respiratory: Positive for cough, shortness of breath, sleep disturbances due to breathing, sputum production and wheezing. Negative for hemoptysis and snoring.    Hematologic/Lymphatic: Negative for bleeding problem. Does not bruise/bleed easily.   Skin: Negative for dry skin, itching and rash.   Musculoskeletal: Negative for arthritis, joint pain, joint swelling and myalgias.   Gastrointestinal: Positive for bloating, heartburn and nausea. Negative for abdominal pain, constipation, diarrhea,  "flatus, hematemesis, hematochezia, melena and vomiting.   Genitourinary: Negative for dysuria, frequency, hematuria, nocturia and urgency.   Neurological: Positive for light-headedness. Negative for excessive daytime sleepiness, dizziness, headaches and loss of balance.   Psychiatric/Behavioral: Positive for altered mental status (confusion) and memory loss. Negative for depression. The patient has insomnia. The patient is not nervous/anxious.    Allergic/Immunologic:        Seasonal allergies      Vitals:    02/22/19 1203   BP: 149/79   BP Location: Right arm   Patient Position: Sitting   Pulse: 91   Resp: 22   Temp: 97.7 °F (36.5 °C)   TempSrc: Temporal   SpO2: 96%   Weight: 46.7 kg (103 lb)   Height: 157.5 cm (62\")       Objective:     Physical Exam   Constitutional: She appears well-developed. No distress.   Thin/ frail elderly female in wheelchair and with supplemental oxygen   HENT:   Head: Normocephalic and atraumatic.   Eyes: Conjunctivae are normal. Pupils are equal, round, and reactive to light. No scleral icterus.   Neck: Normal range of motion. Neck supple. No JVD present.   Cardiovascular: Normal rate, regular rhythm, normal heart sounds and intact distal pulses. Exam reveals no gallop and no friction rub.   No murmur heard.  Pulmonary/Chest: Effort normal. No respiratory distress. She has wheezes. She has no rales. She exhibits no tenderness.   Expiratory wheezing RUL, RML but no rales or rhonchi   Musculoskeletal: Normal range of motion. She exhibits edema.   +1 pedal/ ankle edema   Neurological: She is alert.   Oriented to person, place but not time.  Poor short term memory    Skin: Skin is warm and dry.   Psychiatric: Her behavior is normal. Judgment and thought content normal.   anxious   Vitals reviewed.      Lab Review: DC notes, cardiac cath/ echo, inpatient labs     Assessment/ Plan:          Diagnosis Plan   1. Stress-induced cardiomyopathy  -Basic Metabolic Panel today  - Attempt to re-start " coreg 3.125 mg BID  - Granddaughter is a CMA and can check BP/pulse daily  - Instructions on parameters to call for SBP <95 or HR <50.  Weigh daily and call for weight gain 3 lbs overnight or 5 lbs in a week  - Ambulate in home QID  - Follow up with Dr. Slaughter in March  - discussed role of HF clinic and s/sx for which she may call again and be seen PRN  - Trial of resuming Maxide QOD for mild edema (weight was 94 lbs @ DC and was 103 lbs today)     2. Chronic obstructive pulmonary disease, unspecified COPD type (CMS/HCC)  - Supplemental oxygen, prednisone, nebulizers   3. Anxiety - Trial of Celexa 10 mg daily  - See PCP for follow up  - QT interval is acceptable @ 440 ms

## 2019-02-22 NOTE — PATIENT INSTRUCTIONS
Weigh daily  Check BP and pulse daily and write it down.  Call the [facility] @ [phone] if top number BP is <95 or if HR <50  Call [facility] if weight increases three pounds overnight or 5 lbs in a week

## 2019-04-22 RX ORDER — ISOSORBIDE MONONITRATE 30 MG/1
TABLET, EXTENDED RELEASE ORAL
Qty: 30 TABLET | Refills: 5 | Status: SHIPPED | OUTPATIENT
Start: 2019-04-22

## 2019-04-22 RX ORDER — CITALOPRAM 10 MG/1
TABLET ORAL
Qty: 30 TABLET | Refills: 1 | OUTPATIENT
Start: 2019-04-22

## 2019-04-22 RX ORDER — ATORVASTATIN CALCIUM 80 MG/1
TABLET, FILM COATED ORAL
Qty: 30 TABLET | Refills: 5 | Status: SHIPPED | OUTPATIENT
Start: 2019-04-22 | End: 2019-08-30 | Stop reason: SDUPTHER

## 2019-04-22 RX ORDER — RANITIDINE 300 MG/1
TABLET ORAL
Qty: 60 TABLET | Refills: 5 | Status: SHIPPED | OUTPATIENT
Start: 2019-04-22 | End: 2019-08-29 | Stop reason: SDUPTHER

## 2019-04-22 NOTE — TELEPHONE ENCOUNTER
Patient last seen on 2/22/19 and was started on a trial of citalopram 10mg daily with instructions to follow up with her primary care for further care and refills. Refills denied.

## 2019-04-29 RX ORDER — DOXEPIN HYDROCHLORIDE 50 MG/1
CAPSULE ORAL
Qty: 30 CAPSULE | Refills: 5 | Status: SHIPPED | OUTPATIENT
Start: 2019-04-29

## 2019-04-29 RX ORDER — TRIAMTERENE AND HYDROCHLOROTHIAZIDE 37.5; 25 MG/1; MG/1
TABLET ORAL
Qty: 30 TABLET | Refills: 5 | Status: SHIPPED | OUTPATIENT
Start: 2019-04-29

## 2019-05-03 RX ORDER — LANOLIN ALCOHOL/MO/W.PET/CERES
CREAM (GRAM) TOPICAL
Qty: 60 TABLET | Refills: 5 | Status: SHIPPED | OUTPATIENT
Start: 2019-05-03

## 2019-05-23 RX ORDER — PREDNISONE 1 MG/1
3 TABLET ORAL DAILY
Qty: 90 TABLET | Refills: 5 | Status: SHIPPED | OUTPATIENT
Start: 2019-05-23

## 2019-08-29 RX ORDER — RANITIDINE 300 MG/1
TABLET ORAL
Qty: 180 TABLET | Refills: 1 | Status: SHIPPED | OUTPATIENT
Start: 2019-08-29

## 2019-08-30 RX ORDER — ATORVASTATIN CALCIUM 80 MG/1
TABLET, FILM COATED ORAL
Qty: 30 TABLET | Refills: 0 | Status: SHIPPED | OUTPATIENT
Start: 2019-08-30 | End: 2019-10-02 | Stop reason: SDUPTHER

## 2019-11-04 NOTE — PROGRESS NOTES
Physical Therapy Discharge Summary  Date:  2019    Patient Name:  German Ny    :  1940  MRN: [id number]    Restrictions/Precautions:    Pertinent Medical History:  Medical/Treatment Diagnosis Information:  ·   thoracic back pain; strain/sprain; s/p compression fracture; difficulty walking     Insurance/Certification information:    Medicare  Physician Information:    Car Kohli PA-C  Plan of care signed (Y/N):    Visit# / total visits:     12/    G-Code (if applicable):      Date / Visit # G-Code Applied:         Progress Note: []  Yes  [x]  No  Next due by: Visit #10      Subjective:  N/A. Objective:  Observation:   Test measurements: Back Index: 62% , TU\". Palpation:    Exercises:  Exercise Resistance/Repetitions Other comments   Nustep L4 - 5'    Standing marching w/support 1' x 2    Standing heel-toe raises 20 x 2         Pulleys 3'    scap squeezes 20x    Mid rows with T-band Red: 2 x15                               Other Therapeutic Activities:     Manual Treatments:      Modalities:  IFC with MH to T-spine x  15' . Not today. Treatment/Activity Tolerance:  [] Patient tolerated treatment well [] Patient limited by fatigue  [] Patient limited by pain  [] Patient limited by other medical complications  [x] Other:  Pt never returned for further PT but did meet 1/4 LTG's. Prognosis: [x] Good [] Fair  [] Poor    Goals  Short term goals  Time Frame for Short term goals: 2-3 weeks  Short term goal 1: Independent with HEP. - met  Short term goal 2: Report a 25% decrease in back pain. - met  Long term goals  Time Frame for Long term goals : 6 weeks  Long term goal 1: Achieve back pain at or less than 1/10 > 75% of time with ADL's and I-ADL's. Long term goal 2: Achieve a TUG time of 14 seconds. - met  Long term goal 3: Report a 50% decrease in fear of falling. Long term goal 4: Achieve a Back Index score of 30 or less.   Patient Goals   Patient goals : alleviate back pain, reduce fall risk    Patient Requires Follow-up: [] Yes  [x] No    Plan:   [] Continue per plan of care [] Alter current plan (see comments)  [] Plan of care initiated [] Hold pending MD visit [x] Discharge    Plan for Next Session:   D/C.       Electronically signed by:   Electronically signed by Purvi Winkler PTA on 11/4/2019 at 3:29 PM

## 2023-05-16 NOTE — PROGRESS NOTES
Have you seen any other physician or provider since your last visit? no    Have you had any other diagnostic tests since your last visit? no    Have you changed or stopped any medications since your last visit including any over-the-counter medicines, vitamins, or herbal medicines? no     Are you taking all your prescribed medications? Yes  If NO, why? -  N/A      REVIEW OF SYSTEMS:  Review of Systems   Constitutional: Negative for chills and fever. HENT: Negative for ear pain and sore throat. Eyes: Negative for pain and visual disturbance. Respiratory: Negative for cough and shortness of breath. Cardiovascular: Negative for chest pain, palpitations and leg swelling. Gastrointestinal: Positive for nausea. Negative for abdominal pain and vomiting. Genitourinary: Negative for dysuria and hematuria. Musculoskeletal: Negative for joint swelling. Skin: Negative for rash. Neurological: Negative for dizziness and weakness. Psychiatric/Behavioral: Negative for sleep disturbance.
Administered Date(s) Administered    Influenza Virus Vaccine 01/11/2012, 11/14/2012, 10/24/2013, 10/14/2014    Influenza, Jacoby Fitzgerald, 3 Years and older, IM (Fluzone 3 yrs and older or Afluria 5 yrs and older) 11/03/2017, 10/03/2018    Tdap (Boostrix, Adacel) 06/21/2011, 06/07/2015        Health Maintenance   Topic Date Due    Shingles Vaccine (1 of 2 - 2 Dose Series) 11/22/1991    Low dose CT lung screening  05/12/2012    Potassium monitoring  10/03/2019    Creatinine monitoring  10/03/2019    DTaP/Tdap/Td vaccine (3 - Td) 06/07/2025    DEXA (modify frequency per FRAX score)  Completed    Flu vaccine  Completed     Recommendations for Preventive Services Due: see orders and patient instructions/AVS.  . Recommended screening schedule for the next 5-10 years is provided to the patient in written form: see Patient Instructions/AVS.    Orders Placed This Encounter   Medications    ondansetron (ZOFRAN ODT) 4 MG disintegrating tablet     Sig: Take 1 tablet by mouth every 8 hours as needed for Nausea or Vomiting     Dispense:  30 tablet     Refill:  5    azithromycin (ZITHROMAX) 250 MG tablet     Sig: Take 2 tabs on day 1, then 1 tab daily for next 4 days     Dispense:  6 tablet     Refill:  0     Orders Placed This Encounter   Procedures    XR CHEST STANDARD (2 VW)    US screening for AAA    INFLUENZA, QUADV, 3 YRS AND OLDER, IM, MDV, 0.5ML (FLUZONE QUADV)    LIPID PANEL    COMPREHENSIVE METABOLIC PANEL    CBC WITH AUTO DIFFERENTIAL    IRON AND TIBC    FERRITIN    VITAMIN B12 & FOLATE     Patient Instructions   Zithromax for bronchitis. Labs today. Needs chest xray and US of abdomen to screen for aneurysm. Weight is down 8 lbs. If she would like to have a shingles or hepatitis A vaccine, the pharmacy should be able to give them to her. Return in about 3 months (around 1/3/2019).
Topical Steroids Counseling: I discussed with the patient that prolonged use of topical steroids can result in the increased appearance of superficial blood vessels (telangiectasias), lightening (hypopigmentation) and thinning of the skin (atrophy).  Patient understands to avoid using high potency steroids in skin folds, the groin or the face.  The patient verbalized understanding of the proper use and possible adverse effects of topical steroids.  All of the patient's questions and concerns were addressed.

## (undated) DEVICE — PINNACLE INTRODUCER SHEATH: Brand: PINNACLE

## (undated) DEVICE — PK CATH CARD 10

## (undated) DEVICE — GUIDE CATHETER: Brand: MACH1™

## (undated) DEVICE — GW J TP FIX CORE .035 150

## (undated) DEVICE — ANGIO-SEAL VIP VASCULAR CLOSURE DEVICE: Brand: ANGIO-SEAL

## (undated) DEVICE — CATH DIAG EXPO M/ PK 6FR FL4/FR4 PIG 3PK